# Patient Record
Sex: FEMALE | Race: WHITE | Employment: UNEMPLOYED
[De-identification: names, ages, dates, MRNs, and addresses within clinical notes are randomized per-mention and may not be internally consistent; named-entity substitution may affect disease eponyms.]

---

## 2017-11-19 ENCOUNTER — HEALTH MAINTENANCE LETTER (OUTPATIENT)
Age: 57
End: 2017-11-19

## 2023-04-07 ENCOUNTER — HOSPITAL ENCOUNTER (INPATIENT)
Facility: CLINIC | Age: 63
LOS: 3 days | Discharge: HOME | End: 2023-04-10
Attending: EMERGENCY MEDICINE | Admitting: STUDENT IN AN ORGANIZED HEALTH CARE EDUCATION/TRAINING PROGRAM
Payer: COMMERCIAL

## 2023-04-07 ENCOUNTER — OFFICE VISIT (OUTPATIENT)
Dept: URGENT CARE | Facility: URGENT CARE | Age: 63
End: 2023-04-07
Payer: COMMERCIAL

## 2023-04-07 ENCOUNTER — APPOINTMENT (OUTPATIENT)
Dept: CT IMAGING | Facility: CLINIC | Age: 63
End: 2023-04-07
Attending: EMERGENCY MEDICINE
Payer: COMMERCIAL

## 2023-04-07 VITALS
OXYGEN SATURATION: 93 % | TEMPERATURE: 97.4 F | RESPIRATION RATE: 12 BRPM | HEART RATE: 74 BPM | DIASTOLIC BLOOD PRESSURE: 72 MMHG | SYSTOLIC BLOOD PRESSURE: 105 MMHG

## 2023-04-07 DIAGNOSIS — J18.9 PNEUMONIA DUE TO INFECTIOUS ORGANISM, UNSPECIFIED LATERALITY, UNSPECIFIED PART OF LUNG: ICD-10-CM

## 2023-04-07 DIAGNOSIS — E03.5 MYXEDEMA COMA (H): ICD-10-CM

## 2023-04-07 DIAGNOSIS — E06.3 HASHIMOTO'S THYROIDITIS: ICD-10-CM

## 2023-04-07 DIAGNOSIS — F17.210 CIGARETTE SMOKER: ICD-10-CM

## 2023-04-07 DIAGNOSIS — R41.0 DELIRIUM: Primary | ICD-10-CM

## 2023-04-07 LAB
ALBUMIN SERPL BCG-MCNC: 4.5 G/DL (ref 3.5–5.2)
ALP SERPL-CCNC: 92 U/L (ref 35–104)
ALT SERPL W P-5'-P-CCNC: 90 U/L (ref 10–35)
ANION GAP SERPL CALCULATED.3IONS-SCNC: 11 MMOL/L (ref 7–15)
AST SERPL W P-5'-P-CCNC: ABNORMAL U/L
AST SERPL W P-5'-P-CCNC: NORMAL U/L
BASOPHILS # BLD AUTO: 0.1 10E3/UL (ref 0–0.2)
BASOPHILS NFR BLD AUTO: 1 %
BILIRUB SERPL-MCNC: 0.2 MG/DL
BUN SERPL-MCNC: 9.9 MG/DL (ref 8–23)
CALCIUM SERPL-MCNC: 9.5 MG/DL (ref 8.8–10.2)
CHLORIDE SERPL-SCNC: 100 MMOL/L (ref 98–107)
CREAT SERPL-MCNC: 1.04 MG/DL (ref 0.51–0.95)
DEPRECATED HCO3 PLAS-SCNC: 28 MMOL/L (ref 22–29)
EOSINOPHIL # BLD AUTO: 0.1 10E3/UL (ref 0–0.7)
EOSINOPHIL NFR BLD AUTO: 1 %
ERYTHROCYTE [DISTWIDTH] IN BLOOD BY AUTOMATED COUNT: 16 % (ref 10–15)
GFR SERPL CREATININE-BSD FRML MDRD: 60 ML/MIN/1.73M2
GLUCOSE SERPL-MCNC: 78 MG/DL (ref 70–99)
HCT VFR BLD AUTO: 39.5 % (ref 35–47)
HGB BLD-MCNC: 12.9 G/DL (ref 11.7–15.7)
IMM GRANULOCYTES # BLD: 0.2 10E3/UL
IMM GRANULOCYTES NFR BLD: 2 %
LYMPHOCYTES # BLD AUTO: 2.3 10E3/UL (ref 0.8–5.3)
LYMPHOCYTES NFR BLD AUTO: 23 %
MCH RBC QN AUTO: 31.3 PG (ref 26.5–33)
MCHC RBC AUTO-ENTMCNC: 32.7 G/DL (ref 31.5–36.5)
MCV RBC AUTO: 96 FL (ref 78–100)
MONOCYTES # BLD AUTO: 0.4 10E3/UL (ref 0–1.3)
MONOCYTES NFR BLD AUTO: 4 %
NEUTROPHILS # BLD AUTO: 7 10E3/UL (ref 1.6–8.3)
NEUTROPHILS NFR BLD AUTO: 69 %
NRBC # BLD AUTO: 0 10E3/UL
NRBC BLD AUTO-RTO: 0 /100
PLATELET # BLD AUTO: 307 10E3/UL (ref 150–450)
POTASSIUM SERPL-SCNC: 4.1 MMOL/L (ref 3.4–5.3)
PROT SERPL-MCNC: 8.1 G/DL (ref 6.4–8.3)
RBC # BLD AUTO: 4.12 10E6/UL (ref 3.8–5.2)
SODIUM SERPL-SCNC: 139 MMOL/L (ref 136–145)
T4 FREE SERPL-MCNC: <0.1 NG/DL (ref 0.9–1.7)
TSH SERPL DL<=0.005 MIU/L-ACNC: 37.51 UIU/ML (ref 0.3–4.2)
WBC # BLD AUTO: 10 10E3/UL (ref 4–11)

## 2023-04-07 PROCEDURE — 70450 CT HEAD/BRAIN W/O DYE: CPT

## 2023-04-07 PROCEDURE — 80053 COMPREHEN METABOLIC PANEL: CPT | Performed by: EMERGENCY MEDICINE

## 2023-04-07 PROCEDURE — 87040 BLOOD CULTURE FOR BACTERIA: CPT | Performed by: EMERGENCY MEDICINE

## 2023-04-07 PROCEDURE — 99285 EMERGENCY DEPT VISIT HI MDM: CPT | Mod: 25 | Performed by: EMERGENCY MEDICINE

## 2023-04-07 PROCEDURE — 96365 THER/PROPH/DIAG IV INF INIT: CPT | Performed by: EMERGENCY MEDICINE

## 2023-04-07 PROCEDURE — 99207 PR NO CHARGE LOS: CPT

## 2023-04-07 PROCEDURE — 36415 COLL VENOUS BLD VENIPUNCTURE: CPT | Performed by: EMERGENCY MEDICINE

## 2023-04-07 PROCEDURE — 82533 TOTAL CORTISOL: CPT | Performed by: STUDENT IN AN ORGANIZED HEALTH CARE EDUCATION/TRAINING PROGRAM

## 2023-04-07 PROCEDURE — 84439 ASSAY OF FREE THYROXINE: CPT | Performed by: EMERGENCY MEDICINE

## 2023-04-07 PROCEDURE — 120N000002 HC R&B MED SURG/OB UMMC

## 2023-04-07 PROCEDURE — 99291 CRITICAL CARE FIRST HOUR: CPT | Performed by: EMERGENCY MEDICINE

## 2023-04-07 PROCEDURE — 250N000011 HC RX IP 250 OP 636: Performed by: EMERGENCY MEDICINE

## 2023-04-07 PROCEDURE — 84443 ASSAY THYROID STIM HORMONE: CPT | Performed by: EMERGENCY MEDICINE

## 2023-04-07 PROCEDURE — 85025 COMPLETE CBC W/AUTO DIFF WBC: CPT | Performed by: EMERGENCY MEDICINE

## 2023-04-07 PROCEDURE — 84155 ASSAY OF PROTEIN SERUM: CPT | Performed by: EMERGENCY MEDICINE

## 2023-04-07 RX ORDER — LEVOTHYROXINE SODIUM 20 UG/ML
300 INJECTION, SOLUTION INTRAVENOUS ONCE
Status: COMPLETED | OUTPATIENT
Start: 2023-04-07 | End: 2023-04-08

## 2023-04-07 RX ORDER — CEFTRIAXONE 1 G/1
1 INJECTION, POWDER, FOR SOLUTION INTRAMUSCULAR; INTRAVENOUS ONCE
Status: COMPLETED | OUTPATIENT
Start: 2023-04-07 | End: 2023-04-08

## 2023-04-07 RX ORDER — LIOTHYRONINE SODIUM 10 UG/ML
10 INJECTION, SOLUTION INTRAVENOUS ONCE
Status: COMPLETED | OUTPATIENT
Start: 2023-04-07 | End: 2023-04-08

## 2023-04-07 RX ORDER — AZITHROMYCIN 500 MG/5ML
500 INJECTION, POWDER, LYOPHILIZED, FOR SOLUTION INTRAVENOUS ONCE
Status: COMPLETED | OUTPATIENT
Start: 2023-04-07 | End: 2023-04-08

## 2023-04-07 RX ADMIN — CEFTRIAXONE SODIUM 1 G: 1 INJECTION, POWDER, FOR SOLUTION INTRAMUSCULAR; INTRAVENOUS at 23:06

## 2023-04-07 ASSESSMENT — ACTIVITIES OF DAILY LIVING (ADL)
ADLS_ACUITY_SCORE: 35
ADLS_ACUITY_SCORE: 35

## 2023-04-07 NOTE — PROGRESS NOTES
"Patient brought in by her son for altered mental status, somnolence and weakness.  Was seen yesterday at [facility] and diagnosed with pneumonia.  Hasn't been able to start therapy.  Today has been somnolent and keeps repeating \"I just want to go to sleep.\"  Arousable but disoriented, weak and unsteady.  The patient appears to be in an acute delirium and the differential diagnosis is broad.  She is likely to benefit from IV therapies and diagnostics that are readily available at an acute care facility.  Offered to call paramedics for transportation.  Her son feels that he can provide transportation.    Devon Tanner MD   "

## 2023-04-08 LAB
ALBUMIN SERPL BCG-MCNC: 4.1 G/DL (ref 3.5–5.2)
ALBUMIN UR-MCNC: NEGATIVE MG/DL
ALP SERPL-CCNC: 87 U/L (ref 35–104)
ALT SERPL W P-5'-P-CCNC: 76 U/L (ref 10–35)
ANION GAP SERPL CALCULATED.3IONS-SCNC: 11 MMOL/L (ref 7–15)
APPEARANCE UR: CLEAR
AST SERPL W P-5'-P-CCNC: ABNORMAL U/L
BILIRUB SERPL-MCNC: 0.2 MG/DL
BILIRUB UR QL STRIP: NEGATIVE
BUN SERPL-MCNC: 10.5 MG/DL (ref 8–23)
CALCIUM SERPL-MCNC: 8.9 MG/DL (ref 8.8–10.2)
CHLORIDE SERPL-SCNC: 102 MMOL/L (ref 98–107)
COLOR UR AUTO: ABNORMAL
CORTIS SERPL-MCNC: 12.6 UG/DL
CORTIS SERPL-MCNC: 9.6 UG/DL
CREAT SERPL-MCNC: 0.81 MG/DL (ref 0.51–0.95)
DEPRECATED HCO3 PLAS-SCNC: 25 MMOL/L (ref 22–29)
GFR SERPL CREATININE-BSD FRML MDRD: 81 ML/MIN/1.73M2
GLUCOSE SERPL-MCNC: 127 MG/DL (ref 70–99)
GLUCOSE UR STRIP-MCNC: NEGATIVE MG/DL
HGB UR QL STRIP: NEGATIVE
KETONES UR STRIP-MCNC: NEGATIVE MG/DL
LEUKOCYTE ESTERASE UR QL STRIP: NEGATIVE
MUCOUS THREADS #/AREA URNS LPF: PRESENT /LPF
NITRATE UR QL: NEGATIVE
PH UR STRIP: 5.5 [PH] (ref 5–7)
POTASSIUM SERPL-SCNC: 4.6 MMOL/L (ref 3.4–5.3)
PROT SERPL-MCNC: 7.5 G/DL (ref 6.4–8.3)
RBC URINE: <1 /HPF
SODIUM SERPL-SCNC: 138 MMOL/L (ref 136–145)
SP GR UR STRIP: 1.01 (ref 1–1.03)
T3 SERPL-MCNC: 59 NG/DL (ref 85–202)
T4 FREE SERPL-MCNC: 0.62 NG/DL (ref 0.9–1.7)
UROBILINOGEN UR STRIP-MCNC: NORMAL MG/DL
WBC URINE: 1 /HPF

## 2023-04-08 PROCEDURE — 250N000011 HC RX IP 250 OP 636: Performed by: EMERGENCY MEDICINE

## 2023-04-08 PROCEDURE — 84480 ASSAY TRIIODOTHYRONINE (T3): CPT | Performed by: STUDENT IN AN ORGANIZED HEALTH CARE EDUCATION/TRAINING PROGRAM

## 2023-04-08 PROCEDURE — 82533 TOTAL CORTISOL: CPT | Performed by: STUDENT IN AN ORGANIZED HEALTH CARE EDUCATION/TRAINING PROGRAM

## 2023-04-08 PROCEDURE — 258N000003 HC RX IP 258 OP 636: Performed by: EMERGENCY MEDICINE

## 2023-04-08 PROCEDURE — 99223 1ST HOSP IP/OBS HIGH 75: CPT | Performed by: STUDENT IN AN ORGANIZED HEALTH CARE EDUCATION/TRAINING PROGRAM

## 2023-04-08 PROCEDURE — 258N000001 HC RX 258: Performed by: INTERNAL MEDICINE

## 2023-04-08 PROCEDURE — 120N000002 HC R&B MED SURG/OB UMMC

## 2023-04-08 PROCEDURE — 84439 ASSAY OF FREE THYROXINE: CPT | Performed by: STUDENT IN AN ORGANIZED HEALTH CARE EDUCATION/TRAINING PROGRAM

## 2023-04-08 PROCEDURE — 250N000009 HC RX 250: Performed by: EMERGENCY MEDICINE

## 2023-04-08 PROCEDURE — 99222 1ST HOSP IP/OBS MODERATE 55: CPT | Mod: GC | Performed by: INTERNAL MEDICINE

## 2023-04-08 PROCEDURE — 93010 ELECTROCARDIOGRAM REPORT: CPT | Performed by: INTERNAL MEDICINE

## 2023-04-08 PROCEDURE — 250N000009 HC RX 250: Performed by: STUDENT IN AN ORGANIZED HEALTH CARE EDUCATION/TRAINING PROGRAM

## 2023-04-08 PROCEDURE — 36415 COLL VENOUS BLD VENIPUNCTURE: CPT | Performed by: STUDENT IN AN ORGANIZED HEALTH CARE EDUCATION/TRAINING PROGRAM

## 2023-04-08 PROCEDURE — 96375 TX/PRO/DX INJ NEW DRUG ADDON: CPT | Performed by: EMERGENCY MEDICINE

## 2023-04-08 PROCEDURE — 93005 ELECTROCARDIOGRAM TRACING: CPT

## 2023-04-08 PROCEDURE — 250N000011 HC RX IP 250 OP 636: Performed by: STUDENT IN AN ORGANIZED HEALTH CARE EDUCATION/TRAINING PROGRAM

## 2023-04-08 PROCEDURE — 84155 ASSAY OF PROTEIN SERUM: CPT | Performed by: STUDENT IN AN ORGANIZED HEALTH CARE EDUCATION/TRAINING PROGRAM

## 2023-04-08 PROCEDURE — 250N000013 HC RX MED GY IP 250 OP 250 PS 637: Performed by: INTERNAL MEDICINE

## 2023-04-08 PROCEDURE — 81001 URINALYSIS AUTO W/SCOPE: CPT | Performed by: EMERGENCY MEDICINE

## 2023-04-08 RX ORDER — LIDOCAINE 40 MG/G
CREAM TOPICAL
Status: DISCONTINUED | OUTPATIENT
Start: 2023-04-08 | End: 2023-04-10 | Stop reason: HOSPADM

## 2023-04-08 RX ORDER — LEVOTHYROXINE SODIUM 20 UG/ML
100 INJECTION, SOLUTION INTRAVENOUS DAILY
Status: DISCONTINUED | OUTPATIENT
Start: 2023-04-08 | End: 2023-04-08

## 2023-04-08 RX ORDER — CALCIUM CARBONATE 500 MG/1
500 TABLET, CHEWABLE ORAL 3 TIMES DAILY PRN
Status: DISCONTINUED | OUTPATIENT
Start: 2023-04-08 | End: 2023-04-10 | Stop reason: HOSPADM

## 2023-04-08 RX ORDER — DEXTROSE MONOHYDRATE, SODIUM CHLORIDE, AND POTASSIUM CHLORIDE 50; 1.49; 9 G/1000ML; G/1000ML; G/1000ML
INJECTION, SOLUTION INTRAVENOUS CONTINUOUS
Status: DISCONTINUED | OUTPATIENT
Start: 2023-04-08 | End: 2023-04-10

## 2023-04-08 RX ORDER — ALBUTEROL SULFATE 5 MG/ML
2.5 SOLUTION RESPIRATORY (INHALATION)
Status: DISCONTINUED | OUTPATIENT
Start: 2023-04-08 | End: 2023-04-08

## 2023-04-08 RX ORDER — LEVALBUTEROL INHALATION SOLUTION 1.25 MG/3ML
1.25 SOLUTION RESPIRATORY (INHALATION)
Status: DISCONTINUED | OUTPATIENT
Start: 2023-04-08 | End: 2023-04-10 | Stop reason: HOSPADM

## 2023-04-08 RX ORDER — METHYLPREDNISOLONE SODIUM SUCCINATE 125 MG/2ML
125 INJECTION, POWDER, LYOPHILIZED, FOR SOLUTION INTRAMUSCULAR; INTRAVENOUS ONCE
Status: COMPLETED | OUTPATIENT
Start: 2023-04-08 | End: 2023-04-08

## 2023-04-08 RX ORDER — LEVOTHYROXINE SODIUM 20 UG/ML
90 INJECTION, SOLUTION INTRAVENOUS DAILY
Status: DISCONTINUED | OUTPATIENT
Start: 2023-04-08 | End: 2023-04-08

## 2023-04-08 RX ORDER — LIOTHYRONINE SODIUM 10 UG/ML
5 INJECTION, SOLUTION INTRAVENOUS EVERY 8 HOURS
Status: DISCONTINUED | OUTPATIENT
Start: 2023-04-08 | End: 2023-04-10

## 2023-04-08 RX ORDER — AZITHROMYCIN 500 MG/5ML
500 INJECTION, POWDER, LYOPHILIZED, FOR SOLUTION INTRAVENOUS EVERY 24 HOURS
Status: COMPLETED | OUTPATIENT
Start: 2023-04-09 | End: 2023-04-10

## 2023-04-08 RX ORDER — LEVOTHYROXINE SODIUM 20 UG/ML
100 INJECTION, SOLUTION INTRAVENOUS DAILY
Status: DISCONTINUED | OUTPATIENT
Start: 2023-04-09 | End: 2023-04-10

## 2023-04-08 RX ORDER — ONDANSETRON 4 MG/1
4 TABLET, ORALLY DISINTEGRATING ORAL EVERY 6 HOURS PRN
Status: DISCONTINUED | OUTPATIENT
Start: 2023-04-08 | End: 2023-04-09

## 2023-04-08 RX ORDER — CEFTRIAXONE 1 G/1
1 INJECTION, POWDER, FOR SOLUTION INTRAMUSCULAR; INTRAVENOUS EVERY 24 HOURS
Status: DISCONTINUED | OUTPATIENT
Start: 2023-04-08 | End: 2023-04-10 | Stop reason: HOSPADM

## 2023-04-08 RX ORDER — ACETAMINOPHEN 325 MG/1
650 TABLET ORAL EVERY 4 HOURS PRN
Status: DISCONTINUED | OUTPATIENT
Start: 2023-04-08 | End: 2023-04-10 | Stop reason: HOSPADM

## 2023-04-08 RX ORDER — ENOXAPARIN SODIUM 100 MG/ML
40 INJECTION SUBCUTANEOUS EVERY 24 HOURS
Status: DISCONTINUED | OUTPATIENT
Start: 2023-04-08 | End: 2023-04-10 | Stop reason: HOSPADM

## 2023-04-08 RX ADMIN — AZITHROMYCIN DIHYDRATE 500 MG: 500 INJECTION, POWDER, LYOPHILIZED, FOR SOLUTION INTRAVENOUS at 00:23

## 2023-04-08 RX ADMIN — LIOTHYRONINE SODIUM 5 MCG: 10 INJECTION, SOLUTION INTRAVENOUS at 09:01

## 2023-04-08 RX ADMIN — LIOTHYRONINE SODIUM 10 MCG: 10 INJECTION, SOLUTION INTRAVENOUS at 00:19

## 2023-04-08 RX ADMIN — LEVOTHYROXINE SODIUM 300 MCG: 20 INJECTION, SOLUTION INTRAVENOUS at 00:15

## 2023-04-08 RX ADMIN — POTASSIUM CHLORIDE, DEXTROSE MONOHYDRATE AND SODIUM CHLORIDE: 150; 5; 900 INJECTION, SOLUTION INTRAVENOUS at 20:58

## 2023-04-08 RX ADMIN — LEVOTHYROXINE SODIUM 90 MCG: 20 INJECTION, SOLUTION INTRAVENOUS at 04:04

## 2023-04-08 RX ADMIN — METHYLPREDNISOLONE SODIUM SUCCINATE 125 MG: 125 INJECTION, POWDER, FOR SOLUTION INTRAMUSCULAR; INTRAVENOUS at 01:35

## 2023-04-08 RX ADMIN — LIOTHYRONINE SODIUM 5 MCG: 10 INJECTION, SOLUTION INTRAVENOUS at 17:55

## 2023-04-08 RX ADMIN — ENOXAPARIN SODIUM 40 MG: 40 INJECTION SUBCUTANEOUS at 09:00

## 2023-04-08 RX ADMIN — POTASSIUM CHLORIDE, DEXTROSE MONOHYDRATE AND SODIUM CHLORIDE: 150; 5; 900 INJECTION, SOLUTION INTRAVENOUS at 13:20

## 2023-04-08 RX ADMIN — CEFTRIAXONE SODIUM 1 G: 1 INJECTION, POWDER, FOR SOLUTION INTRAMUSCULAR; INTRAVENOUS at 23:04

## 2023-04-08 ASSESSMENT — ACTIVITIES OF DAILY LIVING (ADL)
ADLS_ACUITY_SCORE: 40
ADLS_ACUITY_SCORE: 35
ADLS_ACUITY_SCORE: 40
ADLS_ACUITY_SCORE: 35
ADLS_ACUITY_SCORE: 40
ADLS_ACUITY_SCORE: 35
ADLS_ACUITY_SCORE: 40
ADLS_ACUITY_SCORE: 35
ADLS_ACUITY_SCORE: 35

## 2023-04-08 NOTE — H&P
[facility]    History and Physical - Hospitalist Service, GOLD TEAM        Date of Admission:  4/7/2023    Assessment & Plan      Myranda Muir is a 62 year old female admitted on 4/7/2023. She has a PMH of hypothyroidism 2/2 Hashimoto's, COPD, hx of SDH, and depression who presents for evaluation of fatigue and altered mental status c/f myxedema coma.      Myxedema coma  Hx of hypothyroidism, not taking her levothyroxine for 6 months reportedly due to concern for side effects. As reported to her PCP has had fatigue, low energy, low motivation, dry skin, constipation, weight gain. S/p levothyroxine 300 mcg and liothyronine 10 mcg boluses in the ED.  - Endocrine consulted, please call in AM  - Discussed with pharmacy, can start levothyroxine 1.6 mcg/kg/day after the bolus  Based on weight of 58 kg, please obtain updated weight. Can start liothyronine 2.5-10 mcg q8H, will start with 5 mcg for now.   - When able to take PO, would switch to levothyroxine PO  - discuss with endocrine timing of repeat labs  - Telemetry, step-down unit  - CMP, cortisol in AM    MIO  Cr up to 1.04 from 0.6-0.8 but last labs most recent were 2016, unclear what new baseline or most recent baseline is.   - CTM CMP    CAP  COPD  Dx with CXR 4/6 that showed streaky infiiltrates in the lingula and LLL and RML, with increased productive cough and SOB was prescribed steroids, albuterol, and Augmentin but never started it.   - no fever, no hypoxia. Agree with IV CTX/azithro for CAP coverage that was started in the ED. Given 1 dose of IV steroids - no wheezing or increased WOB on my exam, will hold off additional steroids for now  - albuterol PRN    Depression  Hx of depression, isolation from others. Not sure how much her hypothyroidism is playing a role with her mood. Not on meds and had declined therapy.  - Would revisit this when she is more stable.   - will benefit from SW consult, concerns for not  being able to care for self and care for her home, etc  - will need PT/OT as well       Diet: Combination Diet Regular Diet Adult    DVT Prophylaxis: Enoxaparin (Lovenox) SQ  Mclean Catheter: Not present  Lines: None     Cardiac Monitoring: ACTIVE order. Indication: myxedema coma  Code Status: Full Code      Clinically Significant Risk Factors Present on Admission                    Disposition Plan      Expected Discharge Date: 04/10/2023                Pete العلي MD (Sally)  Internal Medicine/Pediatrics  Hospitalist    Hospitalist Service, [facility]  Securely message with [email or web address] (more info)  Text page via [facility] Paging/Directory   See signed in provider for up to date coverage information    ______________________________________________________________________    Chief Complaint   lethargy    Unable to obtain a history from the patient due to mental status  History obtained by chart review.     History of Present Illness   Myranda Muir is a 62 year old female with PMH of hypothyroidism 2/2 Hashimoto's, COPD, hx of SDH, and depression who presents for evaluation of fatigue and altered mental status. Was seen initially on 4/6/23 by new PCP to establish care, w c/o fatigue, low energy, low motivation, and isolation for many years. Had not been taking her levothyroxine for 6 months due to concern for side effects including decreased bone density, and had endorsed weight gain, dry skin, and constipation. Also c/o SOB. Overall sxs felt to be manifestation of severe depression and referred to therapy. CXR done that showed streaky infiltrates in the lingula and LLL and RML, with increased productive cough and SOB started on steroids, albuterol, and Augmentin. Rec restarting levothyroxine. 4/7 brought to urgent care for evaluation of altered mental status, somnolence, and weakness. Had not yet started meds for presumed PNA. Recommended evaluation in the ED.  "    Pt herself does not provide any history, only tells me she is tired and \"can I go back to sleep?\" Per ED note, \"Per patient's son, she has been increasingly fatigued over the past several months.  She has been having more difficulty getting around and caring for her self.  She has been having swelling in her hands and feet.  She was seen on 4/6/2023 and diagnosed with pneumonia.  Patient's son states that when he went to see her today she was fatigued and incoherent.  She had slept from 5 PM to 6:30 AM.  He attempted to assist her to the bathroom but patient was unable to make it on time.  She admits to him that she has been having increasing difficulty making it to the bathroom on her own and has been increasingly weak.  She has not taken her levothyroxine in approximately 6 months.  No other symptoms noted.\"    In the ED, she was VSS. Labs remarkable for TSH elevated to 37.51 and free T4 <0.10. Nl CBC. Cr 1.04 (from 0.85 in 2016). CT head negative.       Past Medical History    Hypothyroidism  Depression  COPD  Hx of SDH  Hx of eczema     Past Surgical History   No past surgical history on file.    Prior to Admission Medications   Prior to Admission Medications   Prescriptions Last Dose Informant Patient Reported? Taking?   levothyroxine (SYNTHROID,LEVOTHROID) 100 MCG tablet Unknown  No Yes   Sig: Take 1 tablet (100 mcg) by mouth daily      Facility-Administered Medications: None        Physical Exam   Vital Signs: Temp: 97.1  F (36.2  C) Temp src: Oral BP: 125/85 Pulse: 64   Resp: 11 SpO2: 95 % O2 Device: Nasal cannula Oxygen Delivery: 5 LPM  Weight: 136 lbs 0 oz    General: sleeping, wakes on command but falls back asleep  HEENT: NCAT, no nasal discharge, dry mucous membranes  CV: RRR, no murmurs noted  Resp: CTAB, no increased WOB, transmitted upper airway sounds (snoring), no wheezing  Abd: Soft, not obviously distended  MSK: mild pitting peripheral edema, extremities cool   Skin: cool, dry, no " jaundice  Neuro: sleeping, tired, lethargic    Medical Decision Making       75 MINUTES SPENT BY ME on the date of service doing chart review, history, exam, documentation & further activities per the note.      Data     Results for orders placed or performed during the hospital encounter of 04/07/23 (from the past 24 hour(s))   CBC with platelets differential    Narrative    The following orders were created for panel order CBC with platelets differential.  Procedure                               Abnormality         Status                     ---------                               -----------         ------                     CBC with platelets and d...[[id number]]  Abnormal            Final result                 Please view results for these tests on the individual orders.   Comprehensive metabolic panel   Result Value Ref Range    Sodium 139 136 - 145 mmol/L    Potassium 4.1 3.4 - 5.3 mmol/L    Chloride 100 98 - 107 mmol/L    Carbon Dioxide (CO2) 28 22 - 29 mmol/L    Anion Gap 11 7 - 15 mmol/L    Urea Nitrogen 9.9 8.0 - 23.0 mg/dL    Creatinine 1.04 (H) 0.51 - 0.95 mg/dL    Calcium 9.5 8.8 - 10.2 mg/dL    Glucose 78 70 - 99 mg/dL    Alkaline Phosphatase 92 35 - 104 U/L    AST      ALT 90 (H) 10 - 35 U/L    Protein Total 8.1 6.4 - 8.3 g/dL    Albumin 4.5 3.5 - 5.2 g/dL    Bilirubin Total 0.2 <=1.2 mg/dL    GFR Estimate 60 (L) >60 mL/min/1.73m2   TSH with free T4 reflex   Result Value Ref Range    TSH 37.51 (H) 0.30 - 4.20 uIU/mL   CBC with platelets and differential   Result Value Ref Range    WBC Count 10.0 4.0 - 11.0 10e3/uL    RBC Count 4.12 3.80 - 5.20 10e6/uL    Hemoglobin 12.9 11.7 - 15.7 g/dL    Hematocrit 39.5 35.0 - 47.0 %    MCV 96 78 - 100 fL    MCH 31.3 26.5 - 33.0 pg    MCHC 32.7 31.5 - 36.5 g/dL    RDW 16.0 (H) 10.0 - 15.0 %    Platelet Count 307 150 - 450 10e3/uL    % Neutrophils 69 %    % Lymphocytes 23 %    % Monocytes 4 %    % Eosinophils 1 %    % Basophils 1 %    % Immature Granulocytes 2 %     NRBCs per 100 WBC 0 <1 /100    Absolute Neutrophils 7.0 1.6 - 8.3 10e3/uL    Absolute Lymphocytes 2.3 0.8 - 5.3 10e3/uL    Absolute Monocytes 0.4 0.0 - 1.3 10e3/uL    Absolute Eosinophils 0.1 0.0 - 0.7 10e3/uL    Absolute Basophils 0.1 0.0 - 0.2 10e3/uL    Absolute Immature Granulocytes 0.2 <=0.4 10e3/uL    Absolute NRBCs 0.0 10e3/uL   T4 free   Result Value Ref Range    Free T4 <0.10 (L) 0.90 - 1.70 ng/dL   CT Head w/o Contrast    Narrative    EXAM: CT HEAD W/O CONTRAST  LOCATION: [facility]  DATE/TIME: 4/7/2023 10:01 PM    INDICATION: AMS  COMPARISON: None.  TECHNIQUE: Routine CT Head without IV contrast. Multiplanar reformats. Dose reduction techniques were used.    FINDINGS:  INTRACRANIAL CONTENTS: No intracranial hemorrhage, extraaxial collection, or mass effect.  No CT evidence of acute infarct. Mild presumed chronic small vessel ischemic changes. Mild generalized volume loss. No hydrocephalus.     VISUALIZED ORBITS/SINUSES/MASTOIDS: No intraorbital abnormality. Mild to moderate mucosal thickening scattered about the paranasal sinuses. No middle ear or mastoid effusion.    BONES/SOFT TISSUES: No acute abnormality.      Impression    IMPRESSION:  1.  No acute intracranial process.   AST   Result Value Ref Range    AST

## 2023-04-08 NOTE — ED PROVIDER NOTES
"ED Provider Note  [facility]      History     Chief Complaint   Patient presents with     Altered Mental Status     HPI  Myranda Muir is a 62 year old female who presents from urgent care with her son with fatigue and altered mental status.  Majority of history is provided by patient's son.  Patient answers all questions with \"I am tired, I just want to sleep.\"  Per patient's son, she has been increasingly fatigued over the past several months.  She has been having more difficulty getting around and caring for her self.  She has been having swelling in her hands and feet.  She was seen on 4/6/2023 and diagnosed with pneumonia.  Patient's son states that when he went to see her today she was fatigued and incoherent.  She had slept from 5 PM to 6:30 AM.  He attempted to assist her to the bathroom but patient was unable to make it on time.  She admits to him that she has been having increasing difficulty making it to the bathroom on her own and has been increasingly weak.  She has not taken her levothyroxine in approximately 6 months.  No other symptoms noted.    Past Medical History  No past medical history on file.  No past surgical history on file.  levothyroxine (SYNTHROID,LEVOTHROID) 100 MCG tablet      No Known Allergies  Family History  Family History   Problem Relation Age of Onset     Diabetes Father      Thyroid Disease Sister      Social History   Social History     Tobacco Use     Smoking status: Some Days     Smokeless tobacco: Never   Substance Use Topics     Alcohol use: No     Comment: quit drinking spring 2013     Drug use: No      Past medical history, past surgical history, medications, allergies, family history, and social history were reviewed with the patient. No additional pertinent items.      A medically appropriate review of systems was performed with pertinent positives and negatives noted in the HPI, and all other systems negative.    Physical Exam   BP: " "104/68  Pulse: 62  Temp: 98.7  F (37.1  C)  Resp: 18  Height: 180.3 cm (5' 11\")  SpO2: (!) 61 %  Physical Exam  Vitals and nursing note reviewed.   Constitutional:       General: She is not in acute distress.     Appearance: She is well-developed. She is not diaphoretic.      Comments: Fatigue, drowsy.   HENT:      Head: Normocephalic and atraumatic.      Mouth/Throat:      Pharynx: No oropharyngeal exudate.   Eyes:      General: No scleral icterus.        Right eye: No discharge.         Left eye: No discharge.      Pupils: Pupils are equal, round, and reactive to light.   Cardiovascular:      Rate and Rhythm: Normal rate and regular rhythm.      Heart sounds: Normal heart sounds. No murmur heard.     No friction rub. No gallop.   Pulmonary:      Effort: Pulmonary effort is normal. No respiratory distress.      Breath sounds: Rhonchi present. No wheezing.   Chest:      Chest wall: No tenderness.   Abdominal:      General: Bowel sounds are normal. There is no distension.      Palpations: Abdomen is soft.      Tenderness: There is no abdominal tenderness.   Musculoskeletal:         General: No tenderness or deformity. Normal range of motion.      Cervical back: Normal range of motion and neck supple.   Skin:     General: Skin is warm and dry.      Coloration: Skin is not pale.      Findings: No erythema or rash.   Neurological:      Mental Status: She is lethargic.      Cranial Nerves: No cranial nerve deficit.           ED Course, Procedures, & Data      Procedures                      Results for orders placed or performed during the hospital encounter of 04/07/23   CT Head w/o Contrast     Status: None    Narrative    EXAM: CT HEAD W/O CONTRAST  LOCATION: [facility]  DATE/TIME: 4/7/2023 10:01 PM    INDICATION: AMS  COMPARISON: None.  TECHNIQUE: Routine CT Head without IV contrast. Multiplanar reformats. Dose reduction techniques were used.    FINDINGS:  INTRACRANIAL " CONTENTS: No intracranial hemorrhage, extraaxial collection, or mass effect.  No CT evidence of acute infarct. Mild presumed chronic small vessel ischemic changes. Mild generalized volume loss. No hydrocephalus.     VISUALIZED ORBITS/SINUSES/MASTOIDS: No intraorbital abnormality. Mild to moderate mucosal thickening scattered about the paranasal sinuses. No middle ear or mastoid effusion.    BONES/SOFT TISSUES: No acute abnormality.      Impression    IMPRESSION:  1.  No acute intracranial process.   Comprehensive metabolic panel     Status: Abnormal   Result Value Ref Range    Sodium 139 136 - 145 mmol/L    Potassium 4.1 3.4 - 5.3 mmol/L    Chloride 100 98 - 107 mmol/L    Carbon Dioxide (CO2) 28 22 - 29 mmol/L    Anion Gap 11 7 - 15 mmol/L    Urea Nitrogen 9.9 8.0 - 23.0 mg/dL    Creatinine 1.04 (H) 0.51 - 0.95 mg/dL    Calcium 9.5 8.8 - 10.2 mg/dL    Glucose 78 70 - 99 mg/dL    Alkaline Phosphatase 92 35 - 104 U/L    AST      ALT 90 (H) 10 - 35 U/L    Protein Total 8.1 6.4 - 8.3 g/dL    Albumin 4.5 3.5 - 5.2 g/dL    Bilirubin Total 0.2 <=1.2 mg/dL    GFR Estimate 60 (L) >60 mL/min/1.73m2   TSH with free T4 reflex     Status: Abnormal   Result Value Ref Range    TSH 37.51 (H) 0.30 - 4.20 uIU/mL   CBC with platelets and differential     Status: Abnormal   Result Value Ref Range    WBC Count 10.0 4.0 - 11.0 10e3/uL    RBC Count 4.12 3.80 - 5.20 10e6/uL    Hemoglobin 12.9 11.7 - 15.7 g/dL    Hematocrit 39.5 35.0 - 47.0 %    MCV 96 78 - 100 fL    MCH 31.3 26.5 - 33.0 pg    MCHC 32.7 31.5 - 36.5 g/dL    RDW 16.0 (H) 10.0 - 15.0 %    Platelet Count 307 150 - 450 10e3/uL    % Neutrophils 69 %    % Lymphocytes 23 %    % Monocytes 4 %    % Eosinophils 1 %    % Basophils 1 %    % Immature Granulocytes 2 %    NRBCs per 100 WBC 0 <1 /100    Absolute Neutrophils 7.0 1.6 - 8.3 10e3/uL    Absolute Lymphocytes 2.3 0.8 - 5.3 10e3/uL    Absolute Monocytes 0.4 0.0 - 1.3 10e3/uL    Absolute Eosinophils 0.1 0.0 - 0.7 10e3/uL    Absolute  Basophils 0.1 0.0 - 0.2 10e3/uL    Absolute Immature Granulocytes 0.2 <=0.4 10e3/uL    Absolute NRBCs 0.0 10e3/uL   T4 free     Status: Abnormal   Result Value Ref Range    Free T4 <0.10 (L) 0.90 - 1.70 ng/dL   AST     Status: None   Result Value Ref Range    AST     CBC with platelets differential     Status: Abnormal    Narrative    The following orders were created for panel order CBC with platelets differential.  Procedure                               Abnormality         Status                     ---------                               -----------         ------                     CBC with platelets and d...[[id number]]  Abnormal            Final result                 Please view results for these tests on the individual orders.     Medications   azithromycin 500 mg (ZITHROMAX) in 0.9% NaCl 250 mL intermittent infusion 500 mg (500 mg Intravenous $New Bag 4/8/23 0023)   methylPREDNISolone sodium succinate (solu-MEDROL) injection 125 mg (has no administration in time range)   levothyroxine injection 300 mcg (300 mcg Intravenous $Given 4/8/23 0015)   liothyronine sodium (TRIOSTAT) injection 10 mcg (10 mcg Intravenous $Given 4/8/23 0019)   cefTRIAXone (ROCEPHIN) 1 g vial to attach to  mL bag for ADULTS or NS 50 mL bag for PEDS (0 g Intravenous Stopped 4/8/23 0005)     Labs Ordered and Resulted from Time of ED Arrival to Time of ED Departure   COMPREHENSIVE METABOLIC PANEL - Abnormal       Result Value    Sodium 139      Potassium 4.1      Chloride 100      Carbon Dioxide (CO2) 28      Anion Gap 11      Urea Nitrogen 9.9      Creatinine 1.04 (*)     Calcium 9.5      Glucose 78      Alkaline Phosphatase 92      AST        ALT 90 (*)     Protein Total 8.1      Albumin 4.5      Bilirubin Total 0.2      GFR Estimate 60 (*)    TSH WITH FREE T4 REFLEX - Abnormal    TSH 37.51 (*)    CBC WITH PLATELETS AND DIFFERENTIAL - Abnormal    WBC Count 10.0      RBC Count 4.12      Hemoglobin 12.9      Hematocrit 39.5       MCV 96      MCH 31.3      MCHC 32.7      RDW 16.0 (*)     Platelet Count 307      % Neutrophils 69      % Lymphocytes 23      % Monocytes 4      % Eosinophils 1      % Basophils 1      % Immature Granulocytes 2      NRBCs per 100 WBC 0      Absolute Neutrophils 7.0      Absolute Lymphocytes 2.3      Absolute Monocytes 0.4      Absolute Eosinophils 0.1      Absolute Basophils 0.1      Absolute Immature Granulocytes 0.2      Absolute NRBCs 0.0     T4 FREE - Abnormal    Free T4 <0.10 (*)    AST    AST       ROUTINE UA WITH MICROSCOPIC REFLEX TO CULTURE   BLOOD CULTURE   BLOOD CULTURE     CT Head w/o Contrast   Final Result   IMPRESSION:   1.  No acute intracranial process.             Critical Care time was 30 minutes for this patient excluding procedures.    Assessment & Plan    This is a 62-year-old female presents with increasing fatigue as well as mental status today.  Symptoms have been progressive over the past several months but patient was noted to be altered today.  Patient was recently diagnosed with pneumonia.  Here patient appears extremely fatigued, states she just wants to sleep.  Patient's son states that she has been unable to walk under her own power today.  Lab work shows a TSH of 37.51.  Free T4 is less than 0.10.  She has not taken her levothyroxine for several months.  Presentation is concerning for myxedema coma.  Patient was given 300 mcg of levothyroxine and 10 mcg of liothyronine.  Due to recent diagnosis of pneumonia she was also given azithromycin and ceftriaxone.  Patient was given Solu-Medrol.  Presentation is concerning for myxedema coma complicated by pneumonia.  We will admit for further monitoring work-up and treatment.    I have reviewed the nursing notes. I have reviewed the findings, diagnosis, plan and need for follow up with the patient.    New Prescriptions    No medications on file       Final diagnoses:   Myxedema coma (H)       Kamran Gaytan DO  [facility]  EMERGENCY DEPARTMENT  4/7/2023     Kamran Gaytan,   04/08/23 0202

## 2023-04-08 NOTE — PROGRESS NOTES
Pt arrived to the unit from ED around 1020    Pt is lethargic-- arouses to repeated voice-- oriented to self and place upon arrival to the floor-- oriented x4 at end of shift.     Came up to unit on 4 LPM nasal cannula-- have been adjusting LPM based on oxygen demands-- oxymask has worked better for pt and pt tolerates well.      TELE in place--- NSR with PVCs.     Frequent productive cough    PIV in the R arm is running continuous fluids.      Pt has not voided since arriving to the floor-- bladder scan done at the end of shift for 231.    Edema noted in the periorbital area.     Thyroid is enlarged.

## 2023-04-08 NOTE — PROGRESS NOTES
See H&P by Pete العلي MD for details of HPI, assessment plan.  Patient seen and evaluated.  Discussed with RN.    Briefly, Myranda Muir is a 62 year old female admitted on 4/7/2023. She has a PMH of hypothyroidism 2/2 Hashimoto's, COPD, hx of SDH, and depression who presents for evaluation of fatigue and altered mental status c/f myxedema coma.    Patient remains lethargic, however alert, interactive, oriented.  Normal work of breathing.  RRR.  Extremities bit cold, distal pulses well felt.  Moving all extremities.  Trace pedal edema, all extremities.  Flat affect.    Vitals:    04/08/23 0700 04/08/23 0715 04/08/23 0913 04/08/23 1012   BP:   119/79 113/66   BP Location:   Left arm Right arm   Pulse: 70 70 79 67   Resp: 11 11 16 18   Temp:   97.6  F (36.4  C)    TempSrc:   Oral    SpO2: 94% 94% 100% 100%   Weight:       Height:         Plan:    - Continue current IV levothyroxine plus liothyroxine. Follow-up daily TFT  - Follow-up endocrinology consultation/recommendations.  -Frequent vitals, EKG, frequent neurochecks.  Cardiac telemetry.  -Empiric IV antibiotics for concern for CAP.   - Follow-up electrolytes, BMP.  -IV fluid.    Rest as prior.     Tejas Ruth MD  [facility]  Contact information available via [facility] Paging/Directory

## 2023-04-08 NOTE — ED NOTES
Additional dose of levothyroxine ordered by hospitalist. Writer verified with jodi RASHEED to give dose.

## 2023-04-08 NOTE — ED TRIAGE NOTES
Triage Assessment     Row Name 04/07/23 1946       Triage Assessment (Adult)    Airway WDL WDL       Respiratory WDL    Respiratory WDL WDL       Skin Circulation/Temperature WDL    Skin Circulation/Temperature WDL WDL       Cardiac WDL    Cardiac WDL WDL       Peripheral/Neurovascular WDL    Peripheral Neurovascular WDL WDL       Cognitive/Neuro/Behavioral WDL    Cognitive/Neuro/Behavioral WDL X;arousability    Level of Consciousness confused

## 2023-04-08 NOTE — ED TRIAGE NOTES
Pt was at the Urgent care and was told to go to ER.   Pt's Son is here with her he states he found  the patient incoherent. Son states patient is not drinking, eating and or talking. Pt is also unable to walk. Son states patient may used some Marijuana today .    At triage , patient is alert and oriented x3, but appears drowsy. Pt denies pain and discomfort. Pt states she feels tired and wants to sleep. Pt denies having weakness to any of her extremities, numbness or tingling. Pt denies hx of stroke.

## 2023-04-09 LAB
GLUCOSE BLDC GLUCOMTR-MCNC: 111 MG/DL (ref 70–99)
GLUCOSE BLDC GLUCOMTR-MCNC: 124 MG/DL (ref 70–99)
HOLD SPECIMEN: NORMAL
T3 SERPL-MCNC: 129 NG/DL (ref 85–202)
T4 FREE SERPL-MCNC: 0.66 NG/DL (ref 0.9–1.7)

## 2023-04-09 PROCEDURE — 36415 COLL VENOUS BLD VENIPUNCTURE: CPT | Performed by: STUDENT IN AN ORGANIZED HEALTH CARE EDUCATION/TRAINING PROGRAM

## 2023-04-09 PROCEDURE — 258N000003 HC RX IP 258 OP 636: Performed by: STUDENT IN AN ORGANIZED HEALTH CARE EDUCATION/TRAINING PROGRAM

## 2023-04-09 PROCEDURE — 250N000013 HC RX MED GY IP 250 OP 250 PS 637: Performed by: INTERNAL MEDICINE

## 2023-04-09 PROCEDURE — 258N000001 HC RX 258: Performed by: INTERNAL MEDICINE

## 2023-04-09 PROCEDURE — 120N000002 HC R&B MED SURG/OB UMMC

## 2023-04-09 PROCEDURE — 250N000009 HC RX 250: Performed by: STUDENT IN AN ORGANIZED HEALTH CARE EDUCATION/TRAINING PROGRAM

## 2023-04-09 PROCEDURE — 84439 ASSAY OF FREE THYROXINE: CPT | Performed by: STUDENT IN AN ORGANIZED HEALTH CARE EDUCATION/TRAINING PROGRAM

## 2023-04-09 PROCEDURE — 84480 ASSAY TRIIODOTHYRONINE (T3): CPT | Performed by: STUDENT IN AN ORGANIZED HEALTH CARE EDUCATION/TRAINING PROGRAM

## 2023-04-09 PROCEDURE — 250N000011 HC RX IP 250 OP 636: Performed by: INTERNAL MEDICINE

## 2023-04-09 PROCEDURE — 258N000001 HC RX 258

## 2023-04-09 PROCEDURE — 250N000011 HC RX IP 250 OP 636: Performed by: STUDENT IN AN ORGANIZED HEALTH CARE EDUCATION/TRAINING PROGRAM

## 2023-04-09 PROCEDURE — 99231 SBSQ HOSP IP/OBS SF/LOW 25: CPT | Mod: GC | Performed by: INTERNAL MEDICINE

## 2023-04-09 PROCEDURE — 99232 SBSQ HOSP IP/OBS MODERATE 35: CPT | Performed by: INTERNAL MEDICINE

## 2023-04-09 RX ORDER — ONDANSETRON 4 MG/1
4 TABLET, ORALLY DISINTEGRATING ORAL EVERY 6 HOURS PRN
Status: DISCONTINUED | OUTPATIENT
Start: 2023-04-09 | End: 2023-04-10 | Stop reason: HOSPADM

## 2023-04-09 RX ORDER — ONDANSETRON 2 MG/ML
4 INJECTION INTRAMUSCULAR; INTRAVENOUS EVERY 6 HOURS PRN
Status: DISCONTINUED | OUTPATIENT
Start: 2023-04-09 | End: 2023-04-10 | Stop reason: HOSPADM

## 2023-04-09 RX ORDER — FAMOTIDINE 20 MG/1
20 TABLET, FILM COATED ORAL 2 TIMES DAILY
Status: DISCONTINUED | OUTPATIENT
Start: 2023-04-09 | End: 2023-04-10 | Stop reason: HOSPADM

## 2023-04-09 RX ORDER — PROCHLORPERAZINE MALEATE 5 MG
5 TABLET ORAL EVERY 6 HOURS PRN
Status: DISCONTINUED | OUTPATIENT
Start: 2023-04-09 | End: 2023-04-10 | Stop reason: HOSPADM

## 2023-04-09 RX ORDER — DEXTROSE MONOHYDRATE, SODIUM CHLORIDE, AND POTASSIUM CHLORIDE 50; 1.49; 9 G/1000ML; G/1000ML; G/1000ML
INJECTION, SOLUTION INTRAVENOUS
Status: COMPLETED
Start: 2023-04-09 | End: 2023-04-09

## 2023-04-09 RX ADMIN — CEFTRIAXONE SODIUM 1 G: 1 INJECTION, POWDER, FOR SOLUTION INTRAMUSCULAR; INTRAVENOUS at 23:41

## 2023-04-09 RX ADMIN — ONDANSETRON 4 MG: 4 TABLET, ORALLY DISINTEGRATING ORAL at 20:01

## 2023-04-09 RX ADMIN — PROCHLORPERAZINE EDISYLATE 5 MG: 5 INJECTION INTRAMUSCULAR; INTRAVENOUS at 03:47

## 2023-04-09 RX ADMIN — ONDANSETRON 4 MG: 2 INJECTION INTRAMUSCULAR; INTRAVENOUS at 05:18

## 2023-04-09 RX ADMIN — LEVOTHYROXINE SODIUM 100 MCG: 20 INJECTION, SOLUTION INTRAVENOUS at 03:55

## 2023-04-09 RX ADMIN — POTASSIUM CHLORIDE, DEXTROSE MONOHYDRATE AND SODIUM CHLORIDE 1000 ML: 150; 5; 900 INJECTION, SOLUTION INTRAVENOUS at 06:39

## 2023-04-09 RX ADMIN — LIOTHYRONINE SODIUM 5 MCG: 10 INJECTION, SOLUTION INTRAVENOUS at 08:12

## 2023-04-09 RX ADMIN — LIOTHYRONINE SODIUM 5 MCG: 10 INJECTION, SOLUTION INTRAVENOUS at 17:05

## 2023-04-09 RX ADMIN — AZITHROMYCIN DIHYDRATE 500 MG: 500 INJECTION, POWDER, LYOPHILIZED, FOR SOLUTION INTRAVENOUS at 00:35

## 2023-04-09 RX ADMIN — ENOXAPARIN SODIUM 40 MG: 40 INJECTION SUBCUTANEOUS at 08:12

## 2023-04-09 RX ADMIN — FAMOTIDINE 20 MG: 20 TABLET ORAL at 20:01

## 2023-04-09 RX ADMIN — FAMOTIDINE 20 MG: 20 TABLET ORAL at 12:44

## 2023-04-09 RX ADMIN — LIOTHYRONINE SODIUM 5 MCG: 10 INJECTION, SOLUTION INTRAVENOUS at 00:37

## 2023-04-09 RX ADMIN — ONDANSETRON 4 MG: 4 TABLET, ORALLY DISINTEGRATING ORAL at 12:44

## 2023-04-09 RX ADMIN — LIOTHYRONINE SODIUM 5 MCG: 10 INJECTION, SOLUTION INTRAVENOUS at 23:41

## 2023-04-09 RX ADMIN — ONDANSETRON 4 MG: 4 TABLET, ORALLY DISINTEGRATING ORAL at 01:45

## 2023-04-09 RX ADMIN — POTASSIUM CHLORIDE, DEXTROSE MONOHYDRATE AND SODIUM CHLORIDE: 150; 5; 900 INJECTION, SOLUTION INTRAVENOUS at 14:47

## 2023-04-09 ASSESSMENT — ACTIVITIES OF DAILY LIVING (ADL)
ADLS_ACUITY_SCORE: 40
ADLS_ACUITY_SCORE: 41
ADLS_ACUITY_SCORE: 40
ADLS_ACUITY_SCORE: 40
ADLS_ACUITY_SCORE: 41
ADLS_ACUITY_SCORE: 41

## 2023-04-09 NOTE — PROGRESS NOTES
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     Was diagnosed with hypothyroidism in 2009 was found to have strongly positive TPO antibodies> 5000 started on levothyroxine with adjusting the dose over the years.     She had an ultrasound thyroid in August 2014 showed diffused enlarged and mildly heterogeneous thyroid.  FNA in October 2014 consistent with lymphocytic Hashimoto's thyroiditis.  Was plan to get total thyroidectomy however she did not want to go with the surgery.  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  Was found to have hypoechoic 3.3 cm nodule in the right lobe of the thyroid.     Prior to the admission she had a visit with her family physician on 4/6/2023 for establish care at that time she was supposed to be on levothyroxine 112 mcg daily (since May 2019 )she was found she stopped taking levothyroxine for over 6 months was complaining of increasing fatigability weight gain and dry skin and constipation.     She has background history of COPD from review of the records she was getting prednisone 40 mg in the past not clear when did she stop it however on/7/23 was placed on prednisone 40 mg daily by her PCP for 5 days however she did not start taking it prior to admission..     On arrival to the emergency department she was found to have AMS her temperature was normal 98.7 F heart rate 62 blood pressure 104/68 respiratory rate 18 was hypoxic on room air 61%.  Following that a started on NC with  "improvement in the oxygenation.  Her free T4 was found to be nondetectable BG was normal at 78 sodium level was normal 139.  CT head with no acute findings.     From reviewing the records there is no recent thyroid function test done for her the last one was in 2016 in [facility] with normal TSH of 1.7.     Received IV levothyroxine 300 mcg at the midnight.   received levothyroxine 90 mcg early morning today at 4 AM   received IV liothyronine 10 mcg once at the midnight   followed by liothyronine 5 mcg TID daily.    Received Solu-Medrol 125 mg at 1 AM.  For COPD exacerbation.    In the assessment was lethargic, oriented, facial myxedema, slow relaxation of the reflexes.    Continued on IV levothyroxine 100 mcg and liothyronine 5 mcg 8 hourly.      Interval history:  She stated she was feeling chills for the night.  Still feeling very tired was sleeping most of the day yesterday.  No bowel movement since the admission prior to admission she did not have constipation.      Physical Examination:  /73 (BP Location: Left arm, Patient Position: Right side, Cuff Size: Adult Regular)   Pulse 76   Temp 97.3  F (36.3  C) (Oral)   Resp 23   Ht 1.803 m (5' 11\")   Wt 61.7 kg (136 lb)   SpO2 97%   BMI 18.97 kg/m    Physical Exam  Vitals reviewed.   Constitutional:       Comments: Lethargic sleepy but easily arousable.   Cardiovascular:      Rate and Rhythm: Normal rate and regular rhythm.   Pulmonary:      Effort: Pulmonary effort is normal.      Breath sounds: Normal breath sounds. No wheezing.   Musculoskeletal:      Right lower leg: No edema.      Left lower leg: No edema.   Neurological:      Mental Status: She is oriented to person, place, and time.   Psychiatric:         Mood and Affect: Mood normal.         Behavior: Behavior normal.       Medications:  Reviewed    Endocrine Labs:   Latest Reference Range & Units 04/07/23 20:28 04/08/23 12:04 04/09/23 08:59   T4 Free 0.90 - 1.70 ng/dL <0.10 (L) 0.62 (L) " 0.66 (L)   Triiodothyronine (T3) 85 - 202 ng/dL  59 (L) 129   TSH 0.30 - 4.20 uIU/mL 37.51 (H)        Latest Reference Range & Units 04/07/23 22:24 04/08/23 12:04   Cortisol Serum ug/dL 12.6 9.6     Assessment and plan:  Myranda Muir is a 62 year old female with PMHx of hypothyroidism secondary to Hashimoto's thyroiditis, COPD, prediabetes history of SDH and depression admitted to the hospital on 4/7/2023 with altered mental status and 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  Known history of hypothyroidism at least since 2009 due to Hashimoto's thyroiditis was found to have significantly elevated TPO antibodies at the time of the diagnosis.  Stop taking levothyroxine for 6 months prior to the admission.  Free T4 not detectable on the admission with TSH level of 37.51.        Concern for myxedema coma:  Severe hypothyroidism:  Goiter:  Right thyroid nodule:  Known history of hypothyroidism due to Hashimoto's thyroiditis.  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  Was referred to ENT surgery in the past however following discussing the possible complication associated with the surgery she decided not to proceed with the surgery.  Last known dose of levothyroxine was 112 mcg daily.  Taken last time 6 months ago.  Free T4 nondetectable on the admission TSH 37.51.  Was hypoxic on the presentation normal sodium no hypoglycemia.  Received loading dose of levothyroxine 300 mcg IV and continued with levothyroxine 90 mcg daily.  Received liothyronine 10 mcg loading dose and continued on 5 mcg 8 hourly.  Received Solu-Medrol 125 mcg 1 dose at 1 AM.  Cortisol level before giving steroids was 12.6.  On 04/09/2023: Received levothyroxine 100 mcg IV and continued on liothyronine 5 mcg 3 times daily.          Recommendations:  -Continue  Levothyroxine 100 mcg IV.    -Continue with liothyronine 5 mcg 3 times daily, reassess needs daily  -Monitor closely for  arrhythmia (started on telemetry).  -Close vitals monitoring.  -POC BG 4 hourly and as needed.  -Electrolytes monitoring/repletion.  -Daily free T4/total T3.  -Needs endocrinology follow-up following the discharge with thyroid ultrasound as outpatient + FNA+/- ENT referral if interested in getting thyroidectomy (if updated, her son can help coordinate follow-up and help patient maintain contact with her care team)     She would like to get follow-up with endocrinology at [place].    Dameon Renee     Endocrine fellow   Pager number: [phone]       I have seen and examined the patient, reviewed records, reviewed and edited the fellow's note.  I agree with the plan of care.    Rich Ryan MD   Division of Diabetes, Endocrinology and Metabolism  Department of Medicine

## 2023-04-09 NOTE — PROGRESS NOTES
[facility]    Medicine Progress Note - Hospitalist Service, GOLD TEAM 18    Date of Admission:  4/7/2023    Assessment & Plan     Myranda Muir is a 62 year old female admitted on 4/7/2023. She has a PMH of hypothyroidism 2/2 Hashimoto's, COPD, hx of SDH, and depression who presents for evaluation of fatigue and altered mental status c/f myxedema coma.        ?Myxedema coma  Severe hypothyroidism:  Goiter:  Right thyroid nodule    Known history of hypothyroidism due to Hashimoto's thyroiditis. Non compliant x ~6 months.   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  Was referred to ENT surgery in the past however following discussing the possible complication associated with the surgery she decided not to proceed with the surgery.  Last known dose of levothyroxine was 112 mcg daily.  Taken last time 6 months ago.  Free T4 nondetectable on the admission TSH 37.51.  Was hypoxic on the presentation normal sodium no hypoglycemia.  Received loading dose of levothyroxine 300 mcg IV and continued with levothyroxine 90 mcg daily.  Received liothyronine 10 mcg loading dose and continued on 5 mcg 8 hourly.  Received Solu-Medrol 125 mcg 1 dose at 1 AM.    As reported to her PCP has had fatigue, low energy, low motivation, dry skin, constipation, weight gain.  CT head negative in ED.  S/p levothyroxine 300 mcg and liothyronine 10 mcg boluses in the ED.  Remains lethargic, otherwise stable vitals. B/P: 117/78, T: 97.3, P: 72, R: 23  4/9: Follow-up free T4 0.66.  Cortisol 9.6.  EKG with acceptable QT interval.  PVCs present.  Telemetry: No events so far.    - Endocrine consulted, appreciate input.  Following closely.  Per endocrine: 4/8:  -Will increase levothyroxine from tomorrow to 100 mcg IV.    -Continue with liothyronine 5 mcg 3 times daily, reassess needs daily  -Monitor closely for arrhythmia  (started on telemetry).  -Cortisol level before giving steroids was 12.6.  No need for further doses of steroids unless indicated for COPD.  -Close vitals monitoring.  -POC BG 4 hourly and as needed.  -Electrolytes monitoring/repletion.  -Daily free T4/total T3.  -Needs endocrinology follow-up following the discharge with thyroid ultrasound as outpatient + FNA+/- ENT referral if interested in getting thyroidectomy (if updated, her son can help coordinate follow-up and help patient maintain contact with her care team)    Discussed with endocrine 4/8.      MIO  Cr up to 1.04 from 0.6-0.8 but last labs most recent were 2016, unclear what new baseline or most recent baseline is.   -Resolved with hydration.     CAP  COPD  Dx with CXR 4/6 that showed streaky infiiltrates in the lingula and LLL and RML, with increased productive cough and SOB was prescribed steroids, albuterol, and Augmentin but never started it.  UA negative.  - no fever, no hypoxia. Agree with IV CTX/azithro for CAP coverage that was started in the ED. Given 1 dose of IV steroids - no wheezing or increased WOB on my exam, will hold off additional steroids for now  - albuterol PRN  -Pulse ox.  -I-S.     Depression  Hx of depression, isolation from others. Not sure how much her hypothyroidism is playing a role with her mood. Not on meds and had declined therapy.  - Would revisit this when she is more stable.   - will benefit from SW consult, concerns for not being able to care for self and care for her home, etc  - PT/OT consult.           Diet: Combination Diet Regular Diet Adult    DVT Prophylaxis: Enoxaparin (Lovenox) SQ  Mclean Catheter: Not present  Lines: None     Cardiac Monitoring: ACTIVE order. Indication: myxoedema coma  Code Status: Full Code      Clinically Significant Risk Factors                                Disposition Plan      Expected Discharge Date: 04/11/2023                  Tejas Ruth MD  Hospitalist Service, GOLD TEAM 18  M  [facility]  Securely message with [employer] (more info)  Text page via [employer] Paging/Directory   See signed in provider for up to date coverage information  ______________________________________________________________________    Interval History     Interval events reviewed.   Remains lethargic.  Arousable.  Interactive when awake.  Wanted to sleep.  Denies fever or chills.   No cough or cp or sob.   No LH or dizziness.   Nausea present.  No vomiting.  No pain abdomen.  No new sensory or motor complaint.   No new rash.    No other new or acute medical concern      Physical Exam   Vital Signs: Temp: 97.3  F (36.3  C) Temp src: Oral BP: 117/78 Pulse: 72   Resp: 23 SpO2: 97 % O2 Device: None (Room air) Oxygen Delivery: 2 LPM  Weight: 136 lbs 0 oz    General Appearance: Sleepy but arousable.  Interactive, NAD  HEENT: AT/NC, Anicteric, Moist MM  Respiratory: Normal work of breathing. CTA BL.   Cardiovascular: S1 S2 Regular.  GI/Abd: Soft. NT. ND. No g/r.  Extremities: Edema all extremities.  Distally warm and well-perfused.  Neuro:  Grossly non focal.   Psychiatry: flat affect.     Medical Decision Making       40 MINUTES SPENT BY ME on the date of service doing chart review, history, exam, documentation & further activities per the note.      Data     I have personally reviewed the following data over the past 24 hrs:    N/A  \   N/A   / N/A     N/A N/A N/A /  N/A   N/A N/A N/A \       ALT: N/A AST: N/A AP: N/A TBILI: N/A   ALB: N/A TOT PROTEIN: N/A LIPASE: N/A       TSH: N/A T4: 0.66 (L) A1C: N/A       Imaging results reviewed over the past 24 hrs:   No results found for this or any previous visit (from the past 24 hour(s)).

## 2023-04-09 NOTE — PROGRESS NOTES
Pt is an assist of 1 with a walker and gait belt.      TELE in place-- NSR with runs of PVCs noted.     Facial edema noted    Thyroid enlarged.     Pt has been lethargic- keeps wanting to sleep in between cares.  Does arouse to voice and is capable of answering assessment questions-- follows commands.     Sats have been >92% on RA.      Voids using the commode.     BG checks Q4    Reported nausea-- antiemetic given with favorable results.      Around 1600 pt was beginning to become a bit more alert-- was asking to order food, wanted to go home.  Was agitated about being hooked up to so many cords-- compromised with pt to disconnect fluids for a short period of time for a short break-- pt was agreeable.      Ate a few bites for lunch and for dinner. Encourage more intake.

## 2023-04-09 NOTE — PLAN OF CARE
"/66 (BP Location: Left arm, Patient Position: Right side, Cuff Size: Adult Regular)   Pulse 81   Temp 98.3  F (36.8  C) (Oral)   Resp 14   Ht 1.803 m (5' 11\")   Wt 61.7 kg (136 lb)   SpO2 97%   BMI 18.97 kg/m       Alert x 3, disoriented to time, lethargic and sleepy  VSS, RR 10 SPO2 sating 97%, weaned oxygen to 2 L oxy mask.  Swollen thyroid, C/O mild sob and cough, denies chest pain, nausea and vomiting. Normoactive BS, LBM 4/7. Continuous telemetry monitoring, NSR with PVC'S. Pt reported mild headache, refused tylenol. Continent of B/B, voided in the bathroom.c/o nausea/dry heaves. PRN Zofran given. Pt reports some relief.      Infrequent wet productive cough, SPO2 97% on RA currently.  D 5,O.9NS with 20 mEQ potassium infusing in right PIV. Will continue to follow POC.      "

## 2023-04-10 VITALS
WEIGHT: 136 LBS | BODY MASS INDEX: 19.04 KG/M2 | OXYGEN SATURATION: 97 % | TEMPERATURE: 99.1 F | SYSTOLIC BLOOD PRESSURE: 107 MMHG | HEART RATE: 79 BPM | HEIGHT: 71 IN | RESPIRATION RATE: 16 BRPM | DIASTOLIC BLOOD PRESSURE: 54 MMHG

## 2023-04-10 LAB
ATRIAL RATE - MUSE: 78 BPM
DIASTOLIC BLOOD PRESSURE - MUSE: NORMAL MMHG
GLUCOSE BLDC GLUCOMTR-MCNC: 107 MG/DL (ref 70–99)
GLUCOSE BLDC GLUCOMTR-MCNC: 122 MG/DL (ref 70–99)
GLUCOSE BLDC GLUCOMTR-MCNC: 130 MG/DL (ref 70–99)
HOLD SPECIMEN: NORMAL
INTERPRETATION ECG - MUSE: NORMAL
P AXIS - MUSE: 83 DEGREES
PR INTERVAL - MUSE: 160 MS
QRS DURATION - MUSE: 92 MS
QT - MUSE: 412 MS
QTC - MUSE: 469 MS
R AXIS - MUSE: 79 DEGREES
SYSTOLIC BLOOD PRESSURE - MUSE: NORMAL MMHG
T AXIS - MUSE: 259 DEGREES
T3 SERPL-MCNC: 77 NG/DL (ref 85–202)
T4 FREE SERPL-MCNC: 0.6 NG/DL (ref 0.9–1.7)
VENTRICULAR RATE- MUSE: 78 BPM

## 2023-04-10 PROCEDURE — 36415 COLL VENOUS BLD VENIPUNCTURE: CPT | Performed by: STUDENT IN AN ORGANIZED HEALTH CARE EDUCATION/TRAINING PROGRAM

## 2023-04-10 PROCEDURE — 250N000013 HC RX MED GY IP 250 OP 250 PS 637: Performed by: INTERNAL MEDICINE

## 2023-04-10 PROCEDURE — 99232 SBSQ HOSP IP/OBS MODERATE 35: CPT | Mod: GC | Performed by: INTERNAL MEDICINE

## 2023-04-10 PROCEDURE — 84480 ASSAY TRIIODOTHYRONINE (T3): CPT | Performed by: STUDENT IN AN ORGANIZED HEALTH CARE EDUCATION/TRAINING PROGRAM

## 2023-04-10 PROCEDURE — 99239 HOSP IP/OBS DSCHRG MGMT >30: CPT | Performed by: INTERNAL MEDICINE

## 2023-04-10 PROCEDURE — 258N000001 HC RX 258: Performed by: INTERNAL MEDICINE

## 2023-04-10 PROCEDURE — 84439 ASSAY OF FREE THYROXINE: CPT | Performed by: STUDENT IN AN ORGANIZED HEALTH CARE EDUCATION/TRAINING PROGRAM

## 2023-04-10 PROCEDURE — 250N000009 HC RX 250: Performed by: STUDENT IN AN ORGANIZED HEALTH CARE EDUCATION/TRAINING PROGRAM

## 2023-04-10 PROCEDURE — 250N000011 HC RX IP 250 OP 636: Performed by: STUDENT IN AN ORGANIZED HEALTH CARE EDUCATION/TRAINING PROGRAM

## 2023-04-10 PROCEDURE — 258N000003 HC RX IP 258 OP 636: Performed by: STUDENT IN AN ORGANIZED HEALTH CARE EDUCATION/TRAINING PROGRAM

## 2023-04-10 RX ORDER — LEVOTHYROXINE SODIUM 100 UG/1
100 TABLET ORAL DAILY
Qty: 30 TABLET | Refills: 3 | Status: SHIPPED | OUTPATIENT
Start: 2023-04-10

## 2023-04-10 RX ORDER — LEVOTHYROXINE SODIUM 100 UG/1
100 TABLET ORAL
Status: DISCONTINUED | OUTPATIENT
Start: 2023-04-11 | End: 2023-04-10 | Stop reason: HOSPADM

## 2023-04-10 RX ORDER — FAMOTIDINE 20 MG/1
20 TABLET, FILM COATED ORAL 2 TIMES DAILY PRN
Qty: 20 TABLET | Refills: 0 | Status: SHIPPED | OUTPATIENT
Start: 2023-04-10

## 2023-04-10 RX ORDER — ALBUTEROL SULFATE 90 UG/1
2 AEROSOL, METERED RESPIRATORY (INHALATION) EVERY 6 HOURS PRN
Qty: 18 G | Refills: 1 | Status: SHIPPED | OUTPATIENT
Start: 2023-04-10

## 2023-04-10 RX ADMIN — FAMOTIDINE 20 MG: 20 TABLET ORAL at 08:32

## 2023-04-10 RX ADMIN — LEVOTHYROXINE SODIUM 100 MCG: 20 INJECTION, SOLUTION INTRAVENOUS at 04:28

## 2023-04-10 RX ADMIN — POTASSIUM CHLORIDE, DEXTROSE MONOHYDRATE AND SODIUM CHLORIDE: 150; 5; 900 INJECTION, SOLUTION INTRAVENOUS at 05:15

## 2023-04-10 RX ADMIN — ENOXAPARIN SODIUM 40 MG: 40 INJECTION SUBCUTANEOUS at 08:32

## 2023-04-10 RX ADMIN — LIOTHYRONINE SODIUM 5 MCG: 10 INJECTION, SOLUTION INTRAVENOUS at 10:05

## 2023-04-10 RX ADMIN — AZITHROMYCIN DIHYDRATE 500 MG: 500 INJECTION, POWDER, LYOPHILIZED, FOR SOLUTION INTRAVENOUS at 00:34

## 2023-04-10 ASSESSMENT — ACTIVITIES OF DAILY LIVING (ADL)
ADLS_ACUITY_SCORE: 41
ADLS_ACUITY_SCORE: 24
ADLS_ACUITY_SCORE: 24
ADLS_ACUITY_SCORE: 43
ADLS_ACUITY_SCORE: 24
ADLS_ACUITY_SCORE: 43
ADLS_ACUITY_SCORE: 43

## 2023-04-10 NOTE — PLAN OF CARE
Myranda Muir is a 62 year old female who is here for myxedema coma. Pt is being followed by endocrinology.    NURSING ASSESSMENT:  -Pt is a lot more alert today and participating in cares. Pt took shower.  -Endocrine discontinued IV thyroid medications and added PO medications. Both teams are OK with pt discharging this evening.    DISCHARGE DISPOSITION:  Pt. discharged at 1715 to home, and left with personal belongings. Pt. received complete discharge paperwork and medications as filled by discharge pharmacy. Pt. was given times of last dose for all discharge medications in writing on discharge medication sheets. Pt. to follow up with PCP and endocrinology. Pt. had no further questions at the time of discharge and no unmet needs were identified.      Corin Banks RN

## 2023-04-10 NOTE — DISCHARGE SUMMARY
[facility]  Hospitalist Discharge Summary      Date of Admission:  4/7/2023  Date of Discharge:  4/10/2023  5:12 PM  Discharging Provider: Tejas Ruth MD  Discharge Service: Hospitalist Service, GOLD TEAM 18    Discharge Diagnoses     Severe hypothyroidism ? Myxoedema.     Follow-ups Needed After Discharge   Follow-up Appointments     Adult [facility] Follow-up and recommended labs and tests      Follow up with primary care provider, Khushi Garcia, within 7 days   and as needed for hospital follow- up.  The following labs/tests are   recommended: TSH, Ft3, T4    Follow up with Endocrine in 2 weeks ([place]).      Appointments on [place] and/or [facility] (with [facility] or [facility]   provider or service). Call [phone] if you haven't heard regarding   these appointments within 7 days of discharge.             Unresulted Labs Ordered in the Past 30 Days of this Admission     Date and Time Order Name Status Description    4/7/2023  8:00 PM Blood Culture Peripheral Blood Preliminary     4/7/2023  8:00 PM Blood Culture Peripheral Blood Preliminary           Discharge Disposition   Discharged to home  Condition at discharge: Stable        Hospital Course       Myranda Muir is a 62 year old female admitted on 4/7/2023. She has a PMH of hypothyroidism 2/2 Hashimoto's, COPD, hx of SDH, and depression who presents for evaluation of fatigue and altered mental status c/f myxedema coma.        ?Myxedema coma  Severe hypothyroidism:  Goiter:  Right thyroid nodule     Known history of hypothyroidism due to Hashimoto's thyroiditis. Non compliant x ~6 months.   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  Was referred to ENT surgery in the past however following discussing the possible complication associated with the surgery she decided not to proceed with the surgery.  Last known dose of  levothyroxine was 112 mcg daily.  Taken last time 6 months ago.  Free T4 nondetectable on the admission TSH 37.51.  Was hypoxic on the presentation normal sodium no hypoglycemia.  Received loading dose of levothyroxine 300 mcg IV and continued with levothyroxine 90 mcg daily.  Received liothyronine 10 mcg loading dose and continued on 5 mcg 8 hourly.  Received Solu-Medrol 125 mcg 1 dose at 1 AM.     As reported to her PCP has had fatigue, low energy, low motivation, dry skin, constipation, weight gain.  CT head negative in ED.  S/p levothyroxine 300 mcg and liothyronine 10 mcg boluses in the ED.  Remains lethargic, otherwise stable vitals. B/P: 117/78, T: 97.3, P: 72, R: 23  4/9: Follow-up free T4 0.66.  Cortisol 9.6.  EKG with acceptable QT interval.  PVCs present.  Telemetry: No events so far.  Cortisol level before giving steroids was 12.6.      On 04/09/2023: Received levothyroxine 100 mcg IV and continued on liothyronine 5 mcg 3 times daily.  On 04/10/23: She received IV levothyroxine 100 mcg and 1 dose of liothyronine 5 mcg.     - Endocrine consulted, appreciate input.  Following closely.     Per endocrine: 4/10:  -Switch to oral levothyroxine weight-based dose of 100 mcg daily.  -Needs repeat free T4 in 2 weeks following the discharge.  -Needs endocrinology referral on the discharge for follow-up following the discharge with thyroid ultrasound as outpatient + FNA+/- ENT referral if interested in getting thyroidectomy (if updated, her son can help coordinate follow-up and help patient maintain contact with her care team)     4/10/2023  - Tele: no event. Discontinue  - BG: stable, check bid and prn  - Vitals stable.   - okay to discontinue imc.   -Electrolytes monitoring/repletion.  -Daily free T4/total T3- inpatient.         MIO  Cr up to 1.04 from 0.6-0.8 but last labs most recent were 2016, unclear what new baseline or most recent baseline is.   -Resolved with hydration.     CAP  COPD  Dx with CXR 4/6 that  showed streaky infiiltrates in the lingula and LLL and RML, with increased productive cough and SOB was prescribed steroids, albuterol, and Augmentin but never started it.  UA negative.  - no fever, no hypoxia. Agree with IV CTX/azithro for CAP coverage that was started in the ED. Given 1 dose of IV steroids - no wheezing or increased WOB on my exam, will hold off additional steroids for now  - albuterol PRN  -Pulse ox.  -I-S.     Doing better. Discharge on Augmentin to complete 5 days course.     Depression  Likely 2.2 hypothyroidism.   Better now.         Consultations This Hospital Stay   ENDOCRINE NON-DIABETES ADULT IP CONSULT    Code Status   Full Code    Time Spent on this Encounter   I, Tejas Ruth MD, personally saw the patient today and spent greater than 30 minutes discharging this patient.       Tejas Ruth MD  [facility] MED SURG  [address] [address]  Phone: [phone]  Fax: [phone]  ______________________________________________________________________    Physical Exam   Vital Signs: Temp: 99.1  F (37.3  C) Temp src: Oral BP: 107/54 Pulse: 79   Resp: 16 SpO2: 97 % O2 Device: None (Room air)    Weight: 136 lbs 0 oz  General Appearance: Awake, interactive, NAD  HEENT: AT/NC, Anicteric, Moist MM  Neck: Supple.   Respiratory: Normal work of breathing. On RA  Cardiovascular: RRR  GI/Abd: Soft. NT. ND.   Extremities: Distally wwp.   Neuro: AO x 4, Grossly non focal.   Skin: No acute rash on exposed areas.   Psychiatry: Stable mood.       Primary Care Physician   Khushi Garcia    Discharge Orders      Reason for your hospital stay    Severe hypothyroidism. Pneumonia.   Doing better.   Endocrine consulted.     Per Endocrine:  4/10/2023    -Switch to oral levothyroxine weight-based dose of 100 mcg daily.  -Needs repeat free T4 in 2 weeks following the discharge.  -Needs endocrinology referral on the discharge for follow-up following the discharge  with thyroid ultrasound as outpatient + FNA+/- ENT referral if interested in getting thyroidectomy (if updated, her son can help coordinate follow-up and help patient maintain contact with her care team)     Activity    Your activity upon discharge: activity as tolerated     Adult [facility] Follow-up and recommended labs and tests    Follow up with primary care provider, Khushi Garcia, within 7 days and as needed for hospital follow- up.  The following labs/tests are recommended: TSH, Ft3, T4    Follow up with Endocrine in 2 weeks ([place]).      Appointments on [place] and/or [facility] (with [facility] or [facility] provider or service). Call [phone] if you haven't heard regarding these appointments within 7 days of discharge.     Diet    Follow this diet upon discharge: Orders Placed This Encounter      Combination Diet Regular Diet Adult       Significant Results and Procedures   Most Recent 3 CBC's:Recent Labs   Lab Test 04/07/23 2028   WBC 10.0   HGB 12.9   MCV 96        Most Recent 3 BMP's:Recent Labs   Lab Test 04/10/23  0855 04/10/23  0425 04/10/23  0052 04/09/23  1602 04/08/23  1204 04/07/23 2028   NA  --   --   --   --  138 139   POTASSIUM  --   --   --   --  4.6 4.1   CHLORIDE  --   --   --   --  102 100   CO2  --   --   --   --  25 28   BUN  --   --   --   --  10.5 9.9   CR  --   --   --   --  0.81 1.04*   ANIONGAP  --   --   --   --  11 11   GIN  --   --   --   --  8.9 9.5   * 130* 107*   < > 127* 78    < > = values in this interval not displayed.     Most Recent 2 LFT's:Recent Labs   Lab Test 04/08/23  1204 04/07/23 2028   ALT 76* 90*   ALKPHOS 87 92   BILITOTAL 0.2 0.2     7-Day Micro Results     Collected Updated Procedure Result Status      04/07/2023 2124 04/09/2023 2316 Blood Culture Peripheral Blood [[id number]]   Peripheral Blood    Preliminary result Component Value   Culture No growth after 2 days  [P]                04/07/2023 2028 04/09/2023 2316 Blood Culture  Peripheral Blood [[id number]]   Peripheral Blood    Preliminary result Component Value   Culture No growth after 2 days  [P]                    Most Recent TSH and T4:Recent Labs   Lab Test 04/10/23  0737 04/08/23  1204 04/07/23 2028   TSH  --   --  37.51*   T4 0.60*   < > <0.10*    < > = values in this interval not displayed.     Most Recent ESR & CRP:No lab results found.,   Results for orders placed or performed during the hospital encounter of 04/07/23   CT Head w/o Contrast    Narrative    EXAM: CT HEAD W/O CONTRAST  LOCATION: [facility]  DATE/TIME: 4/7/2023 10:01 PM    INDICATION: AMS  COMPARISON: None.  TECHNIQUE: Routine CT Head without IV contrast. Multiplanar reformats. Dose reduction techniques were used.    FINDINGS:  INTRACRANIAL CONTENTS: No intracranial hemorrhage, extraaxial collection, or mass effect.  No CT evidence of acute infarct. Mild presumed chronic small vessel ischemic changes. Mild generalized volume loss. No hydrocephalus.     VISUALIZED ORBITS/SINUSES/MASTOIDS: No intraorbital abnormality. Mild to moderate mucosal thickening scattered about the paranasal sinuses. No middle ear or mastoid effusion.    BONES/SOFT TISSUES: No acute abnormality.      Impression    IMPRESSION:  1.  No acute intracranial process.       Discharge Medications   Current Discharge Medication List      START taking these medications    Details   albuterol (PROAIR HFA/PROVENTIL HFA/VENTOLIN HFA) 108 (90 Base) MCG/ACT inhaler Inhale 2 puffs into the lungs every 6 hours as needed for shortness of breath, wheezing or cough  Qty: 18 g, Refills: 1    Comments: Pharmacy may dispense brand covered by insurance (Proair, or proventil or ventolin or generic albuterol inhaler)  Associated Diagnoses: Pneumonia due to infectious organism, unspecified laterality, unspecified part of lung      amoxicillin-clavulanate (AUGMENTIN) 875-125 MG tablet Take 1 tablet by mouth 2 times daily  for 3 days  Qty: 6 tablet, Refills: 0    Associated Diagnoses: Pneumonia due to infectious organism, unspecified laterality, unspecified part of lung      famotidine (PEPCID) 20 MG tablet Take 1 tablet (20 mg) by mouth 2 times daily as needed (heartburn)  Qty: 20 tablet, Refills: 0    Associated Diagnoses: Pneumonia due to infectious organism, unspecified laterality, unspecified part of lung         CONTINUE these medications which have CHANGED    Details   levothyroxine (SYNTHROID/LEVOTHROID) 100 MCG tablet Take 1 tablet (100 mcg) by mouth daily  Qty: 30 tablet, Refills: 3    Associated Diagnoses: Hashimoto's thyroiditis           Allergies   No Known Allergies

## 2023-04-10 NOTE — PLAN OF CARE
"/66 (BP Location: Right arm, Patient Position: Supine, Cuff Size: Adult Regular)   Pulse 71   Temp 98.3  F (36.8  C) (Oral)   Resp 16   Ht 1.803 m (5' 11\")   Wt 61.7 kg (136 lb)   SpO2 98%   BMI 18.97 kg/m      A&Ox4 overnight - lethargic. D to time at beginning of shift.     BA on d/t pt not calling appropriately when needing to get OOB.     L PIV infusing continuous fluids. Abx x2 given overnight. Cont. fluids not compatible w/ azithromycin.      Tele in place.     Q4 BG checks.     Assist x1 w/ w/GB.         "

## 2023-04-10 NOTE — PROGRESS NOTES
Endocrine Progress Note  Patient: Myranda Muir   MRN: [id number]  Date of Service: 04/10/2023    Summary and hospitalization course:  Myranda Muir is a 62 year old female with PMHx of hypothyroidism secondary to Hashimoto's thyroiditis, COPD, prediabetes history of SDH and depression admitted to the hospital on 4/7/2023 with altered mental status and 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     Was diagnosed with hypothyroidism in 2009 was found to have strongly positive TPO antibodies> 5000 started on levothyroxine with adjusting the dose over the years.     She had an ultrasound thyroid in August 2014 showed diffused enlarged and mildly heterogeneous thyroid.  FNA in October 2014 consistent with lymphocytic Hashimoto's thyroiditis.  Was plan to get total thyroidectomy however she did not want to go with the surgery.  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  Was found to have hypoechoic 3.3 cm nodule in the right lobe of the thyroid was never biopsied.     Prior to the admission she had a visit with her family physician on 4/6/2023 for establish care at that time she was supposed to be on levothyroxine 112 mcg daily (since May 2019 )she was found she stopped taking levothyroxine for over 6 months was complaining of increasing fatigability weight gain and dry skin and constipation.     She has background history of COPD from review of the records she was getting prednisone 40 mg in the past not clear when did she stop it however on 4/7/23 was placed on prednisone 40 mg daily by her PCP for 5 days however she did not start taking it prior to admission..     On arrival to the emergency department she was found to have AMS her temperature was normal 98.7 F heart rate 62 blood pressure 104/68 respiratory rate 18 was hypoxic on room air 61%.  Following that  "a started on NC with improvement in the oxygenation.  Her free T4 was found to be nondetectable BG was normal at 78 sodium level was normal 139.  CT head with no acute findings.  Was admitted and treated for myxedema coma and pneumonia.     From reviewing the records there is no recent thyroid function test done for her the last one was in 2016 in [facility] with normal TSH of 1.7.     Received IV levothyroxine 300 mcg at the midnight.   received levothyroxine 90 mcg early morning today at 4 AM   received IV liothyronine 10 mcg once at the midnight   followed by liothyronine 5 mcg TID daily.    Received Solu-Medrol 125 mg at 1 AM.  For COPD exacerbation.    In the assessment was lethargic, oriented, facial myxedema, slow relaxation of the reflexes.    Continued on IV levothyroxine 100 mcg and liothyronine 5 mcg 8 hourly.      Interval history:  Her vitals continues to be stable.  Free T4 increased yesterday to  0.66, total T3 normalized 129.  The severe lethargy and fatigability improved significantly today she feels she is completely back to her baseline more energetic.  No nausea or vomiting.  No abdominal pain.  Good appetite.  No chills.      Physical Examination:  /66 (BP Location: Right arm, Patient Position: Supine, Cuff Size: Adult Regular)   Pulse 71   Temp 98.3  F (36.8  C) (Oral)   Resp 16   Ht 1.803 m (5' 11\")   Wt 61.7 kg (136 lb)   SpO2 98%   BMI 18.97 kg/m    Physical Exam  Vitals reviewed.   Constitutional:       General: She is not in acute distress.     Appearance: She is not ill-appearing.   HENT:      Head:      Comments: Facial myxedema   Cardiovascular:      Rate and Rhythm: Normal rate.   Pulmonary:      Effort: Pulmonary effort is normal.   Neurological:      Mental Status: She is alert and oriented to person, place, and time.   Psychiatric:         Mood and Affect: Mood normal.         Behavior: Behavior normal.       Medications:  Reviewed    Endocrine Labs:     Latest " Reference Range & Units 04/07/23 22:24 04/08/23 12:04   Cortisol Serum ug/dL 12.6 9.6      Latest Reference Range & Units 04/07/23 20:28 04/08/23 12:04 04/09/23 08:59 04/10/23 07:37   T4 Free 0.90 - 1.70 ng/dL <0.10 (L) 0.62 (L) 0.66 (L) 0.60 (L)   Triiodothyronine (T3) 85 - 202 ng/dL  59 (L) 129    TSH 0.30 - 4.20 uIU/mL 37.51 (H)        Assessment and plan:  Myranda Muir is a 62 year old female with PMHx of hypothyroidism secondary to Hashimoto's thyroiditis, COPD, prediabetes history of SDH and depression admitted to the hospital on 4/7/2023 with altered mental status and 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  Known history of hypothyroidism at least since 2009 due to Hashimoto's thyroiditis was found to have significantly elevated TPO antibodies at the time of the diagnosis.  Stop taking levothyroxine for 6 months prior to the admission.  Free T4 not detectable on the admission with TSH level of 37.51.        Concern for myxedema coma:  Severe hypothyroidism:  Goiter:  Right thyroid nodule:  Known history of hypothyroidism due to Hashimoto's thyroiditis.  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  Was referred to ENT surgery in the past however following discussing the possible complication associated with the surgery she decided not to proceed with the surgery.  Last known dose of levothyroxine was 112 mcg daily.  Taken last time 6 months ago.  However even before that she was noncompliant with levothyroxine for the last 2 years.  Free T4 nondetectable on the admission TSH 37.51.  Was hypoxic on the presentation normal sodium no hypoglycemia.  Received loading dose of levothyroxine 300 mcg IV and continued with levothyroxine 90 mcg daily.  Received liothyronine 10 mcg loading dose and continued on 5 mcg 8 hourly.  Received Solu-Medrol 125 mcg 1 dose for COPD exacerbation.  Cortisol level before giving steroids was 12.6.  On 04/09/2023:  Received levothyroxine 100 mcg IV and continued on liothyronine 5 mcg 3 times daily.  On 04/10/23: She received IV levothyroxine 100 mcg and 1 dose of liothyronine 5 mcg.       Recommendations:  -Switch to oral levothyroxine weight-based dose of 100 mcg daily.  -Needs repeat free T4 in 2 weeks following the discharge.  -Needs endocrinology referral on the discharge for follow-up following the discharge with thyroid ultrasound as outpatient + FNA+/- ENT referral if interested in getting thyroidectomy (if updated, her son can help coordinate follow-up and help patient maintain contact with her care team)     She would like to get follow-up with endocrinology at [place].    Dameon Renee     Endocrine fellow   Pager number: [phone]    Attending tie-in note  I saw the patient with endocrine fellow Dr. Renee and discussed. Agree above note and plan.       Misti Del Angel MD  Staff Physician  Endocrinology and Metabolism  [facility]  License: [id number]  Pager: [phone]

## 2023-04-10 NOTE — PROGRESS NOTES
"CLINICAL NUTRITION SERVICES - ASSESSMENT NOTE     Nutrition Prescription    RECOMMENDATIONS FOR MDs/PROVIDERS TO ORDER:  None today     Malnutrition Status:    Patient does not meet two of the established criteria necessary for diagnosing malnutrition but is at risk for malnutrition    Recommendations already ordered by Registered Dietitian (RD):  None - pt declined interventions, was encouraged to increase meal intakes     Future/Additional Recommendations:  Will monitor per policy      REASON FOR ASSESSMENT  Myranda Muir is a/an 62 year old female assessed by the dietitian for Positive Admission Nutrition Risk Screen (unknown weight loss, decreased appetite)     NUTRITION/MEDICAL HISTORY  Per chart review: Pt admits to the hospital in the setting of fatigue and altered mental status c/f myxedema coma, severe hypothyroidism, goiter, and right thyroid nodule with a past medical history of hypothyroidism 2/2 Hashimoto's, COPD, hx of SDH, and depression.    Per pt visit: RD visited pt at bedside, pt awake standing in room speaking with visitor, reviewed reason for above, pt note weight gain, not loss, notes a UBW of 130 lbs. RD noted as pt has been fairly lethargic for the past couple of days, it is important to now start taking meals adequately, offered supplement, but pt declined, encouraged protein at meals, encouraged compliance of medical recommendations.     CURRENT NUTRITION ORDERS  Diet: Regular  Intake/Tolerance: Minimal since admission per nursing notes (appears in the setting of lethargy w/pt sleeping most of the time per notes)    LABS  Labs reviewed     MEDICATIONS  IV Rocephin, Levothyroxine, Pepcid     ANTHROPOMETRICS  Height: 180.3 cm (5' 11\")  Most Recent Weight: 61.7 kg (136 lb)    IBW: 70.4 kg  BMI: Normal BMI/almost underweight   Weight History:   63 kg (139 lb) 04/06/2023 - per CE     Weight assessment: Difficult to assess as no recent history available in the chart.     Dosing Weight: 61.7 " kg    ASSESSED NUTRITION NEEDS  Estimated Energy Needs: 8495-9457 kcals/day (25 - 30 kcals/kg)  Justification: Maintenance  Estimated Protein Needs: 60-75 grams protein/day (1 - 1.2 grams of pro/kg)  Justification: Maintenance  Estimated Fluid Needs: 1 mL/kcal  Justification: Maintenance    MALNUTRITION  % Intake: Decreased intake does not meet criteria  % Weight Loss: None noted per pt   Subcutaneous Fat Loss: None observed - visual only - pt does appear thin    Muscle Loss: None observed - visual only - pt does appear thin    Fluid Accumulation/Edema: None noted  Malnutrition Diagnosis: Patient does not meet two of the established criteria necessary for diagnosing malnutrition but is at risk for malnutrition    NUTRITION DIAGNOSIS  Predicted inadequate nutrient intake related to lethargy as evidenced by minimal po intakes since admission       INTERVENTIONS  Implementation  Nutrition Education: RD reviewed the importance of adequate nutritional intakes for improved health status      Goals  Patient to consume % of nutritionally adequate meal trays TID, or the equivalent with supplements/snacks.     Monitoring/Evaluation  Progress toward goals will be monitored and evaluated per protocol.    Ginna Conroy RD, CNSC, LD  [place] [address] Med/Surg RD pager: [phone]

## 2023-04-11 ENCOUNTER — TELEPHONE (OUTPATIENT)
Dept: FAMILY MEDICINE | Facility: CLINIC | Age: 63
End: 2023-04-11
Payer: COMMERCIAL

## 2023-04-11 NOTE — TELEPHONE ENCOUNTER
This pt has not seen Tami Garcia since 2014. New PCP is Jamey Yuen at [facility]. She was see  There to establish care on 4/6/23     SAM Garcia has been removed from her PCP list.    Alyson Haynes, RN, BSN  [facility]

## 2023-04-12 LAB
BACTERIA BLD CULT: NO GROWTH
BACTERIA BLD CULT: NO GROWTH

## 2024-11-23 ENCOUNTER — HOSPITAL ENCOUNTER (EMERGENCY)
Facility: CLINIC | Age: 64
Discharge: HOME | End: 2024-11-24
Attending: INTERNAL MEDICINE | Admitting: INTERNAL MEDICINE
Payer: COMMERCIAL

## 2024-11-23 ENCOUNTER — APPOINTMENT (OUTPATIENT)
Dept: CT IMAGING | Facility: CLINIC | Age: 64
End: 2024-11-23
Attending: INTERNAL MEDICINE
Payer: COMMERCIAL

## 2024-11-23 VITALS
WEIGHT: 126 LBS | SYSTOLIC BLOOD PRESSURE: 136 MMHG | BODY MASS INDEX: 18.04 KG/M2 | DIASTOLIC BLOOD PRESSURE: 80 MMHG | RESPIRATION RATE: 18 BRPM | OXYGEN SATURATION: 95 % | HEART RATE: 96 BPM | HEIGHT: 70 IN | TEMPERATURE: 97.4 F

## 2024-11-23 DIAGNOSIS — R51.9 RIGHT FACIAL PAIN: ICD-10-CM

## 2024-11-23 DIAGNOSIS — E06.9 THYROIDITIS: ICD-10-CM

## 2024-11-23 LAB
ALBUMIN SERPL BCG-MCNC: 3.8 G/DL (ref 3.5–5.2)
ALP SERPL-CCNC: 90 U/L (ref 40–150)
ALT SERPL W P-5'-P-CCNC: 13 U/L (ref 0–50)
ANION GAP SERPL CALCULATED.3IONS-SCNC: 14 MMOL/L (ref 7–15)
AST SERPL W P-5'-P-CCNC: 13 U/L (ref 0–45)
BASE EXCESS BLDV CALC-SCNC: 1 MMOL/L (ref -3–3)
BASOPHILS # BLD AUTO: 0.1 10E3/UL (ref 0–0.2)
BASOPHILS NFR BLD AUTO: 1 %
BILIRUB SERPL-MCNC: 0.4 MG/DL
BUN SERPL-MCNC: 13.1 MG/DL (ref 8–23)
CALCIUM SERPL-MCNC: 8.9 MG/DL (ref 8.8–10.4)
CHLORIDE SERPL-SCNC: 100 MMOL/L (ref 98–107)
CREAT SERPL-MCNC: 0.64 MG/DL (ref 0.51–0.95)
CRP SERPL-MCNC: 96.68 MG/L
EGFRCR SERPLBLD CKD-EPI 2021: >90 ML/MIN/1.73M2
EOSINOPHIL # BLD AUTO: 0.1 10E3/UL (ref 0–0.7)
EOSINOPHIL NFR BLD AUTO: 1 %
ERYTHROCYTE [DISTWIDTH] IN BLOOD BY AUTOMATED COUNT: 12.3 % (ref 10–15)
ERYTHROCYTE [SEDIMENTATION RATE] IN BLOOD BY WESTERGREN METHOD: 61 MM/HR (ref 0–30)
GLUCOSE SERPL-MCNC: 111 MG/DL (ref 70–99)
HCO3 BLDV-SCNC: 24 MMOL/L (ref 21–28)
HCO3 SERPL-SCNC: 23 MMOL/L (ref 22–29)
HCT VFR BLD AUTO: 35.8 % (ref 35–47)
HGB BLD-MCNC: 12.2 G/DL (ref 11.7–15.7)
IMM GRANULOCYTES # BLD: 0 10E3/UL
IMM GRANULOCYTES NFR BLD: 0 %
INR PPP: 1.02 (ref 0.85–1.15)
LACTATE BLD-SCNC: 0.6 MMOL/L
LYMPHOCYTES # BLD AUTO: 1.6 10E3/UL (ref 0.8–5.3)
LYMPHOCYTES NFR BLD AUTO: 15 %
MCH RBC QN AUTO: 30.8 PG (ref 26.5–33)
MCHC RBC AUTO-ENTMCNC: 34.1 G/DL (ref 31.5–36.5)
MCV RBC AUTO: 90 FL (ref 78–100)
MONOCYTES # BLD AUTO: 0.9 10E3/UL (ref 0–1.3)
MONOCYTES NFR BLD AUTO: 9 %
NEUTROPHILS # BLD AUTO: 7.5 10E3/UL (ref 1.6–8.3)
NEUTROPHILS NFR BLD AUTO: 74 %
NRBC # BLD AUTO: 0 10E3/UL
NRBC BLD AUTO-RTO: 0 /100
PCO2 BLDV: 34 MM HG (ref 40–50)
PH BLDV: 7.47 [PH] (ref 7.32–7.43)
PLATELET # BLD AUTO: 234 10E3/UL (ref 150–450)
PO2 BLDV: 64 MM HG (ref 25–47)
POTASSIUM SERPL-SCNC: 3.7 MMOL/L (ref 3.4–5.3)
PROCALCITONIN SERPL IA-MCNC: 0.07 NG/ML
PROT SERPL-MCNC: 6.9 G/DL (ref 6.4–8.3)
RBC # BLD AUTO: 3.96 10E6/UL (ref 3.8–5.2)
SAO2 % BLDV: 94 % (ref 70–75)
SODIUM SERPL-SCNC: 137 MMOL/L (ref 135–145)
T4 FREE SERPL-MCNC: 1.2 NG/DL (ref 0.9–1.7)
TSH SERPL DL<=0.005 MIU/L-ACNC: 7.33 UIU/ML (ref 0.3–4.2)
WBC # BLD AUTO: 10.1 10E3/UL (ref 4–11)

## 2024-11-23 PROCEDURE — 70491 CT SOFT TISSUE NECK W/DYE: CPT

## 2024-11-23 PROCEDURE — 36415 COLL VENOUS BLD VENIPUNCTURE: CPT | Performed by: INTERNAL MEDICINE

## 2024-11-23 PROCEDURE — 85014 HEMATOCRIT: CPT | Performed by: INTERNAL MEDICINE

## 2024-11-23 PROCEDURE — 99284 EMERGENCY DEPT VISIT MOD MDM: CPT | Performed by: INTERNAL MEDICINE

## 2024-11-23 PROCEDURE — 84443 ASSAY THYROID STIM HORMONE: CPT | Performed by: INTERNAL MEDICINE

## 2024-11-23 PROCEDURE — 85049 AUTOMATED PLATELET COUNT: CPT | Performed by: INTERNAL MEDICINE

## 2024-11-23 PROCEDURE — 84145 PROCALCITONIN (PCT): CPT | Performed by: INTERNAL MEDICINE

## 2024-11-23 PROCEDURE — 250N000009 HC RX 250: Performed by: INTERNAL MEDICINE

## 2024-11-23 PROCEDURE — 85004 AUTOMATED DIFF WBC COUNT: CPT | Performed by: INTERNAL MEDICINE

## 2024-11-23 PROCEDURE — 250N000011 HC RX IP 250 OP 636: Performed by: INTERNAL MEDICINE

## 2024-11-23 PROCEDURE — 96374 THER/PROPH/DIAG INJ IV PUSH: CPT | Mod: 59 | Performed by: INTERNAL MEDICINE

## 2024-11-23 PROCEDURE — 84075 ASSAY ALKALINE PHOSPHATASE: CPT | Performed by: INTERNAL MEDICINE

## 2024-11-23 PROCEDURE — 85610 PROTHROMBIN TIME: CPT | Performed by: INTERNAL MEDICINE

## 2024-11-23 PROCEDURE — 84439 ASSAY OF FREE THYROXINE: CPT | Performed by: INTERNAL MEDICINE

## 2024-11-23 PROCEDURE — 99285 EMERGENCY DEPT VISIT HI MDM: CPT | Mod: 25 | Performed by: INTERNAL MEDICINE

## 2024-11-23 PROCEDURE — 80053 COMPREHEN METABOLIC PANEL: CPT | Performed by: INTERNAL MEDICINE

## 2024-11-23 PROCEDURE — 82803 BLOOD GASES ANY COMBINATION: CPT

## 2024-11-23 PROCEDURE — 86140 C-REACTIVE PROTEIN: CPT | Performed by: INTERNAL MEDICINE

## 2024-11-23 PROCEDURE — 85652 RBC SED RATE AUTOMATED: CPT | Performed by: INTERNAL MEDICINE

## 2024-11-23 RX ORDER — IOPAMIDOL 755 MG/ML
100 INJECTION, SOLUTION INTRAVASCULAR ONCE
Status: COMPLETED | OUTPATIENT
Start: 2024-11-23 | End: 2024-11-23

## 2024-11-23 RX ORDER — HYDROMORPHONE HYDROCHLORIDE 1 MG/ML
0.5 INJECTION, SOLUTION INTRAMUSCULAR; INTRAVENOUS; SUBCUTANEOUS
Status: DISCONTINUED | OUTPATIENT
Start: 2024-11-23 | End: 2024-11-24 | Stop reason: HOSPADM

## 2024-11-23 RX ORDER — KETOROLAC TROMETHAMINE 15 MG/ML
15 INJECTION, SOLUTION INTRAMUSCULAR; INTRAVENOUS ONCE
Status: COMPLETED | OUTPATIENT
Start: 2024-11-23 | End: 2024-11-23

## 2024-11-23 RX ADMIN — IOPAMIDOL 90 ML: 755 INJECTION, SOLUTION INTRAVENOUS at 23:25

## 2024-11-23 RX ADMIN — SODIUM CHLORIDE 50 ML: 9 INJECTION, SOLUTION INTRAVENOUS at 23:31

## 2024-11-23 RX ADMIN — KETOROLAC TROMETHAMINE 15 MG: 15 INJECTION, SOLUTION INTRAMUSCULAR; INTRAVENOUS at 22:11

## 2024-11-23 ASSESSMENT — COLUMBIA-SUICIDE SEVERITY RATING SCALE - C-SSRS
1. IN THE PAST MONTH, HAVE YOU WISHED YOU WERE DEAD OR WISHED YOU COULD GO TO SLEEP AND NOT WAKE UP?: NO
2. HAVE YOU ACTUALLY HAD ANY THOUGHTS OF KILLING YOURSELF IN THE PAST MONTH?: NO
6. HAVE YOU EVER DONE ANYTHING, STARTED TO DO ANYTHING, OR PREPARED TO DO ANYTHING TO END YOUR LIFE?: NO

## 2024-11-23 ASSESSMENT — ACTIVITIES OF DAILY LIVING (ADL)
ADLS_ACUITY_SCORE: 0
ADLS_ACUITY_SCORE: 0

## 2024-11-24 RX ORDER — NAPROXEN 500 MG/1
500 TABLET ORAL 2 TIMES DAILY PRN
Qty: 30 TABLET | Refills: 0 | Status: SHIPPED | OUTPATIENT
Start: 2024-11-24

## 2024-11-24 NOTE — DISCHARGE INSTRUCTIONS
Please make an appointment to follow up with Your Endocrinologist  Tuesday as scheduled even if entirely better.

## 2024-11-24 NOTE — ED TRIAGE NOTES
Triage Assessment (Adult)       Row Name 11/23/24 2133          Triage Assessment    Airway WDL WDL        Respiratory WDL    Respiratory WDL WDL

## 2024-11-24 NOTE — ED PROVIDER NOTES
[facility] [place] ([facility])    11/23/24          History     Chief Complaint   Patient presents with    Jaw Pain     Jaw pain for the last 3-4 days. Flares up at night. Took 3 tabs of 81mg of ASA for pain control.     IRMA Muir is a 64 year old female who with PMH of hashimoto's thyroiditis, myxedema coma, contact dermatitis, diffuse goiter, hypertriglyceridemia, hypothyroidism due to hashimoto's thyroiditis, COPD and riedel's thyroiditis presents to the ED due to jaw pain.     Patient states that she has been having throbbing and hurting pain in her neck especially on the right side.  It is a sharp, dull and burning type of pain.  It has been going on for hours.  She believes that there is an infection in her jaw and it could be in her gums as well.  Patient has a growth in her neck.  She thinks it is Riedel's thyroiditis.  She believes there is a tumor that is growing into the tissue.  She has had several biopsies.  She has been putting water and gel but nothing has been helping.  Patient does not feel her neck is swollen.  Her neck has a little bit of redness and it has been like that for the past 4 days.  Patient states that she had gone to [facility] yesterday for her symptoms but no one really was able to see her so she had gone home.  Denies any pain inside of her mouth.        Past Medical History  History reviewed. No pertinent past medical history.  History reviewed. No pertinent surgical history.  levothyroxine (SYNTHROID/LEVOTHROID) 100 MCG tablet  naproxen (NAPROSYN) 500 MG tablet  albuterol (PROAIR HFA/PROVENTIL HFA/VENTOLIN HFA) 108 (90 Base) MCG/ACT inhaler  famotidine (PEPCID) 20 MG tablet      No Known Allergies  Family History  Family History   Problem Relation Age of Onset    Diabetes Father     Thyroid Disease Sister      Social History   Social History     Tobacco Use    Smoking status: Some Days    Smokeless tobacco: Never   Substance Use Topics     "Alcohol use: No     Comment: quit drinking spring 2013    Drug use: No      Past medical history, past surgical history, medications, allergies, family history, and social history were reviewed with the patient. No additional pertinent items.   A complete review of systems was performed with pertinent positives and negatives noted in the HPI, and all other systems negative.    Physical Exam   BP: 136/80  Pulse: 96  Temp: 97.4  F (36.3  C)  Resp: 18  Height: 177.8 cm (5' 10\")  Weight: 57.2 kg (126 lb)  SpO2: 95 %  Physical Exam  Vitals and nursing note reviewed.   Constitutional:       General: She is not in acute distress.     Appearance: She is not diaphoretic.   HENT:      Head: Normocephalic and atraumatic.      Jaw: There is normal jaw occlusion.        Mouth/Throat:      Mouth: Mucous membranes are moist.      Dentition: Abnormal dentition. Does not have dentures. Dental caries present. No dental tenderness, gingival swelling, dental abscesses or gum lesions.      Tongue: No lesions. Tongue does not deviate from midline.      Palate: No mass and lesions.      Pharynx: Oropharynx is clear. Uvula midline.      Tonsils: No tonsillar exudate or tonsillar abscesses.   Eyes:      Extraocular Movements: Extraocular movements intact.      Conjunctiva/sclera: Conjunctivae normal.   Cardiovascular:      Rate and Rhythm: Normal rate.      Heart sounds: Normal heart sounds.   Pulmonary:      Effort: Pulmonary effort is normal. No respiratory distress.      Breath sounds: Normal breath sounds.   Abdominal:      Palpations: Abdomen is soft.      Tenderness: There is no abdominal tenderness.   Musculoskeletal:         General: No tenderness. Normal range of motion.      Cervical back: Normal range of motion and neck supple.   Skin:     General: Skin is warm.      Findings: No rash.   Neurological:      General: No focal deficit present.      Cranial Nerves: No cranial nerve deficit.               ED Course, Procedures, & Data "    9:51 PM  The patient was seen and examined by Pauline Calzada MD. in Room ED 04.    Procedures            Results for orders placed or performed during the hospital encounter of 11/23/24   Soft tissue neck CT w contrast     Status: None    Narrative    EXAM: CT SOFT TISSUE NECK W CONTRAST  LOCATION: [facility]  DATE: 11/23/2024    INDICATION: Right side pain.  COMPARISON: None.  CONTRAST: 90 mL iso 370  TECHNIQUE: Routine CT Soft Tissue Neck with IV contrast. Multiplanar reformats. Dose reduction techniques were used.    FINDINGS:     MUCOSAL SPACES/SOFT TISSUES: Periapical lucency and dental cavity associated with the posterior-most right mandibular molar with mild adjacent soft tissue stranding. No fluid collection. Normal vocal cords and infraglottic trachea. Normal parapharyngeal   space and subcutaneous soft tissues.    LYMPH NODES: No pathologic lymph nodes by size or morphology criteria.     SALIVARY GLANDS: Normal parotid and submandibular glands.    THYROID: Markedly enlarged thyroid gland with apparent adjacent soft tissue stranding and mild areas of curvilinear low-attenuation possibly related to edema. This extends into the upper mediastinum. Mild to moderate narrowing of the trachea at the level   of the thyroid gland though the airway remains patent.    VESSELS: Narrowing of the internal jugular vein in the right low neck. The left internal jugular vein is not well seen.    VISUALIZED INTRACRANIAL/ORBITS/SINUSES: No abnormality of the visualized intracranial compartment or orbits. Visualized paranasal sinuses and mastoid air cells are clear.    OTHER: No destructive osseous lesion. The included lung apices are clear.      Impression    IMPRESSION:   1.  Possible odontogenic infection associated with the posterior-most right mandibular molar. No fluid collection or abscess. Mild adjacent stranding.    2.  Markedly enlarged thyroid gland as detailed above  with evidence of possible inflammation. Consider infectious or inflammatory thyroiditis.   INR     Status: Normal   Result Value Ref Range    INR 1.02 0.85 - 1.15   Comprehensive metabolic panel     Status: Abnormal   Result Value Ref Range    Sodium 137 135 - 145 mmol/L    Potassium 3.7 3.4 - 5.3 mmol/L    Carbon Dioxide (CO2) 23 22 - 29 mmol/L    Anion Gap 14 7 - 15 mmol/L    Urea Nitrogen 13.1 8.0 - 23.0 mg/dL    Creatinine 0.64 0.51 - 0.95 mg/dL    GFR Estimate >90 >60 mL/min/1.73m2    Calcium 8.9 8.8 - 10.4 mg/dL    Chloride 100 98 - 107 mmol/L    Glucose 111 (H) 70 - 99 mg/dL    Alkaline Phosphatase 90 40 - 150 U/L    AST 13 0 - 45 U/L    ALT 13 0 - 50 U/L    Protein Total 6.9 6.4 - 8.3 g/dL    Albumin 3.8 3.5 - 5.2 g/dL    Bilirubin Total 0.4 <=1.2 mg/dL   Procalcitonin     Status: Normal   Result Value Ref Range    Procalcitonin 0.07 <0.50 ng/mL   TSH with free T4 reflex     Status: Abnormal   Result Value Ref Range    TSH 7.33 (H) 0.30 - 4.20 uIU/mL   CRP inflammation     Status: Abnormal   Result Value Ref Range    CRP Inflammation 96.68 (H) <5.00 mg/L   Erythrocyte sedimentation rate auto     Status: Abnormal   Result Value Ref Range    Erythrocyte Sedimentation Rate 61 (H) 0 - 30 mm/hr   CBC with platelets and differential     Status: None   Result Value Ref Range    WBC Count 10.1 4.0 - 11.0 10e3/uL    RBC Count 3.96 3.80 - 5.20 10e6/uL    Hemoglobin 12.2 11.7 - 15.7 g/dL    Hematocrit 35.8 35.0 - 47.0 %    MCV 90 78 - 100 fL    MCH 30.8 26.5 - 33.0 pg    MCHC 34.1 31.5 - 36.5 g/dL    RDW 12.3 10.0 - 15.0 %    Platelet Count 234 150 - 450 10e3/uL    % Neutrophils 74 %    % Lymphocytes 15 %    % Monocytes 9 %    % Eosinophils 1 %    % Basophils 1 %    % Immature Granulocytes 0 %    NRBCs per 100 WBC 0 <1 /100    Absolute Neutrophils 7.5 1.6 - 8.3 10e3/uL    Absolute Lymphocytes 1.6 0.8 - 5.3 10e3/uL    Absolute Monocytes 0.9 0.0 - 1.3 10e3/uL    Absolute Eosinophils 0.1 0.0 - 0.7 10e3/uL    Absolute  Basophils 0.1 0.0 - 0.2 10e3/uL    Absolute Immature Granulocytes 0.0 <=0.4 10e3/uL    Absolute NRBCs 0.0 10e3/uL   iStat Gases (lactate) venous, POCT     Status: Abnormal   Result Value Ref Range    Lactic Acid POCT 0.6 <=2.0 mmol/L    Bicarbonate Venous POCT 24 21 - 28 mmol/L    O2 Sat, Venous POCT 94 (H) 70 - 75 %    pCO2 Venous POCT 34 (L) 40 - 50 mm Hg    pH Venous POCT 7.47 (H) 7.32 - 7.43    pO2 Venous POCT 64 (H) 25 - 47 mm Hg    Base Excess/Deficit (+/-) POCT 1.0 -3.0 - 3.0 mmol/L   T4 free     Status: Normal   Result Value Ref Range    Free T4 1.20 0.90 - 1.70 ng/dL   CBC with platelets differential     Status: None    Narrative    The following orders were created for panel order CBC with platelets differential.  Procedure                               Abnormality         Status                     ---------                               -----------         ------                     CBC with platelets and d...[[id number]]                      Final result                 Please view results for these tests on the individual orders.     Medications   HYDROmorphone (PF) (DILAUDID) injection 0.5 mg (has no administration in time range)   ketorolac (TORADOL) injection 15 mg (15 mg Intravenous $Given 11/23/24 2211)   sodium chloride 0.9 % bag 500mL for CT scan flush use (50 mLs As instructed $Given 11/23/24 6849)   iopamidol (ISOVUE-370) solution 100 mL (90 mLs Intravenous $Given 11/23/24 0387)     Labs Ordered and Resulted from Time of ED Arrival to Time of ED Departure   COMPREHENSIVE METABOLIC PANEL - Abnormal       Result Value    Sodium 137      Potassium 3.7      Carbon Dioxide (CO2) 23      Anion Gap 14      Urea Nitrogen 13.1      Creatinine 0.64      GFR Estimate >90      Calcium 8.9      Chloride 100      Glucose 111 (*)     Alkaline Phosphatase 90      AST 13      ALT 13      Protein Total 6.9      Albumin 3.8      Bilirubin Total 0.4     TSH WITH FREE T4 REFLEX - Abnormal    TSH 7.33 (*)    CRP  INFLAMMATION - Abnormal    CRP Inflammation 96.68 (*)    ERYTHROCYTE SEDIMENTATION RATE AUTO - Abnormal    Erythrocyte Sedimentation Rate 61 (*)    ISTAT GASES LACTATE VENOUS POCT - Abnormal    Lactic Acid POCT 0.6      Bicarbonate Venous POCT 24      O2 Sat, Venous POCT 94 (*)     pCO2 Venous POCT 34 (*)     pH Venous POCT 7.47 (*)     pO2 Venous POCT 64 (*)     Base Excess/Deficit (+/-) POCT 1.0     INR - Normal    INR 1.02     PROCALCITONIN - Normal    Procalcitonin 0.07     T4 FREE - Normal    Free T4 1.20     CBC WITH PLATELETS AND DIFFERENTIAL    WBC Count 10.1      RBC Count 3.96      Hemoglobin 12.2      Hematocrit 35.8      MCV 90      MCH 30.8      MCHC 34.1      RDW 12.3      Platelet Count 234      % Neutrophils 74      % Lymphocytes 15      % Monocytes 9      % Eosinophils 1      % Basophils 1      % Immature Granulocytes 0      NRBCs per 100 WBC 0      Absolute Neutrophils 7.5      Absolute Lymphocytes 1.6      Absolute Monocytes 0.9      Absolute Eosinophils 0.1      Absolute Basophils 0.1      Absolute Immature Granulocytes 0.0      Absolute NRBCs 0.0       Soft tissue neck CT w contrast   Final Result   IMPRESSION:    1.  Possible odontogenic infection associated with the posterior-most right mandibular molar. No fluid collection or abscess. Mild adjacent stranding.      2.  Markedly enlarged thyroid gland as detailed above with evidence of possible inflammation. Consider infectious or inflammatory thyroiditis.             Critical care was not performed.     Medical Decision Making  The patient's presentation was of high complexity (an acute health issue posing potential threat to life or bodily function).    The patient's evaluation involved:  ordering and/or review of 3+ test(s) in this encounter (see separate area of note for details)    The patient's management necessitated moderate risk (prescription drug management including medications given in the ED).    Assessment & Plan    Acute right  facial/neck pain in the pt with h/o Hashimoto's Thyroiditis, poor dentition but no tenderness or suggestion of abscess, initially right face noted to be red ?early sign of shingles but later resolved, pt reports she used cold pack, labs procal normal but mild to mod inflammatory markers elevation, CT inflammation of thyroid, pain resolved after toradol, will discharge with naproxen prn, follow up with her Endocrine Tuesday as scheduled.    I have reviewed the nursing notes. I have reviewed the findings, diagnosis, plan and need for follow up with the patient.    New Prescriptions    NAPROXEN (NAPROSYN) 500 MG TABLET    Take 1 tablet (500 mg) by mouth 2 times daily as needed for moderate pain.       Final diagnoses:   Right facial pain   Thyroiditis   I, PHUC NAIDU, am serving as a trained medical scribe to document services personally performed by Pauline Calzada MD based on the provider's statements to me.     IPauline MD, was physically present and have reviewed and verified the accuracy of this note documented by PHUC NAIDU.     Pauline Calzada MD.  [facility] EMERGENCY DEPARTMENT  11/23/2024     Pauline Calzada MD  11/24/24 0022